# Patient Record
Sex: MALE | Race: WHITE | NOT HISPANIC OR LATINO | Employment: FULL TIME | URBAN - METROPOLITAN AREA
[De-identification: names, ages, dates, MRNs, and addresses within clinical notes are randomized per-mention and may not be internally consistent; named-entity substitution may affect disease eponyms.]

---

## 2017-01-23 ENCOUNTER — HOSPITAL ENCOUNTER (OUTPATIENT)
Dept: RADIOLOGY | Facility: HOSPITAL | Age: 57
Discharge: HOME | End: 2017-01-23
Attending: ORTHOPAEDIC SURGERY
Payer: COMMERCIAL

## 2017-01-23 ENCOUNTER — OFFICE VISIT (OUTPATIENT)
Dept: SPORTS MEDICINE | Facility: CLINIC | Age: 57
End: 2017-01-23
Payer: COMMERCIAL

## 2017-01-23 VITALS
DIASTOLIC BLOOD PRESSURE: 87 MMHG | WEIGHT: 169 LBS | HEIGHT: 67 IN | SYSTOLIC BLOOD PRESSURE: 133 MMHG | BODY MASS INDEX: 26.53 KG/M2 | HEART RATE: 69 BPM

## 2017-01-23 DIAGNOSIS — M25.552 LEFT HIP PAIN: Primary | ICD-10-CM

## 2017-01-23 DIAGNOSIS — M25.552 LEFT HIP PAIN: ICD-10-CM

## 2017-01-23 PROCEDURE — 1159F MED LIST DOCD IN RCRD: CPT | Mod: S$GLB,,, | Performed by: ORTHOPAEDIC SURGERY

## 2017-01-23 PROCEDURE — 99212 OFFICE O/P EST SF 10 MIN: CPT | Mod: S$GLB,,, | Performed by: ORTHOPAEDIC SURGERY

## 2017-01-23 PROCEDURE — 97110 THERAPEUTIC EXERCISES: CPT | Mod: S$GLB,,, | Performed by: ORTHOPAEDIC SURGERY

## 2017-01-23 PROCEDURE — 73502 X-RAY EXAM HIP UNI 2-3 VIEWS: CPT | Mod: 26,,, | Performed by: RADIOLOGY

## 2017-01-23 PROCEDURE — 99999 PR PBB SHADOW E&M-EST. PATIENT-LVL III: CPT | Mod: PBBFAC,,, | Performed by: ORTHOPAEDIC SURGERY

## 2017-01-23 PROCEDURE — 73502 X-RAY EXAM HIP UNI 2-3 VIEWS: CPT | Mod: TC,PO

## 2017-01-23 NOTE — MR AVS SNAPSHOT
[facility]  [address]  [facility] [address]  Phone: [phone]                  Kar Lee   2017 7:30 AM   Appointment    Description:  Male : 1960   Provider:  Kristin Huston MD   Department:  [facility]                To Do List           Future Appointments        Provider Department Dept Phone    2017 7:30 AM Kristin Huston MD [facility] [phone]      Goals (5 Years of Data)     None      [facility]     [facility] Nurse Care Line -  Assistance  Registered nurses in the [facility] Call Center provide clinical advisement, health education, appointment booking, and other advisory services.  Call for this free service at [phone].             Medications           Message regarding Medications     Verify the changes and/or additions to your medication regime listed below are the same as discussed with your clinician today.  If any of these changes or additions are incorrect, please notify your healthcare provider.             Verify that the below list of medications is an accurate representation of the medications you are currently taking.  If none reported, the list may be blank. If incorrect, please contact your healthcare provider. Carry this list with you in case of emergency.           Current Medications     glucosamine-chondroitin 500-400 mg tablet Take 1 tablet by mouth 3 (three) times daily.    lisinopril (PRINIVIL,ZESTRIL) 20 MG tablet Take 20 mg by mouth once daily.    multivitamin (THERAGRAN) per tablet Take 1 tablet by mouth once daily.           Clinical Reference Information           Allergies as of 2017     No Known Allergies      Immunizations Administered on Date of Encounter - 2017     None

## 2017-01-23 NOTE — PROGRESS NOTES
CC: S/p L hip scope 1/19/16    HISTORY OF PRESENT ILLNESS:   Pt is here today for post-operative followup of his hip arthroscopy.  he is doing well.  We have reviewed his findings and discussed plan of care and future treatment options. He runs 10-12 miles per week.    Feels approx 85% better now than prior to surgery.    Left IT band tight       Left:  1/19/16  1. Hip arthroscopic labral debridement  2. Hip arthroscopic femoral neck osteoplasty  3. Hip arthroscopic partial synovectomy/debridement  4. Hip arthroscopic chondroplasty  5. 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 Negative for chills, fever and night sweats.   HENT: Negative for congestion and headaches.    Eyes: Negative for blurred vision, left vision loss and right vision loss.   Cardiovascular: Negative for chest pain and syncope.   Respiratory: Negative for cough and shortness of breath.    Endocrine: Negative for polydipsia, polyphagia and polyuria.   Hematologic/Lymphatic: Negative for bleeding problem. Does not bruise/bleed easily.   Skin: Negative for dry skin, itching and rash.   Musculoskeletal: Negative for falls and muscle weakness.   Gastrointestinal: Negative for abdominal pain and bowel incontinence.   Genitourinary: Negative for bladder incontinence and nocturia.   Neurological: Negative for disturbances in coordination, loss of balance and seizures.   Psychiatric/Behavioral: Negative for depression. The patient does not have insomnia.    Allergic/Immunologic: Negative for hives and persistent infections.     PAST MEDICAL HISTORY:   Past Medical History   Diagnosis Date    Hypertension      PAST  "SURGICAL HISTORY:   Past Surgical History   Procedure Laterality Date    Tonsillectomy      Hip surgery Left 1/19/2016     Dr Huston      FAMILY HISTORY: No family history on file.  SOCIAL HISTORY:   Social History     Social History    Marital status:      Spouse name: N/A    Number of children: N/A    Years of education: N/A     Occupational History    Not on file.     Social History Main Topics    Smoking status: Never Smoker    Smokeless tobacco: Not on file    Alcohol use 1.2 oz/week     1 Cans of beer, 1 Shots of liquor, 0 Standard drinks or equivalent per week      Comment: occasional    Drug use: No    Sexual activity: Not on file     Other Topics Concern    Not on file     Social History Narrative       MEDICATIONS:   Current Outpatient Prescriptions:     glucosamine-chondroitin 500-400 mg tablet, Take 1 tablet by mouth 3 (three) times daily., Disp: , Rfl:     lisinopril (PRINIVIL,ZESTRIL) 20 MG tablet, Take 20 mg by mouth once daily., Disp: , Rfl:     multivitamin (THERAGRAN) per tablet, Take 1 tablet by mouth once daily., Disp: , Rfl:   ALLERGIES: Review of patient's allergies indicates:  No Known Allergies    VITAL SIGNS:   Visit Vitals    /87    Pulse 69    Ht 5' 7" (1.702 m)    Wt 76.7 kg (169 lb)    BMI 26.47 kg/m2                 PHYSICAL EXAMINATION:     Flexion ROM 0-120 degrees   IR with load 15  IR without load 20  ER 35  abd 60  Add 25  - Fadir  5/5 strength  No effusion  2+ DP pulse  No swelling, no calf tenderness  + IT band tightness Left >Right                                                                               ASSESSMENT:                                                                                                                                               1. Status post above, doing well.                                                                                                                               PLAN:                            "                                                                                                                          1. PT IT band  2. Emphasized core function.  3. Ok to progress in sports as tolerated  4. F/U in 6 months    HEP 11267 - I, instructed and demonstrated a IT band HEP. The patient then demonstrated understanding of exercises and proper technique. This program was performed for 16 minutes.

## 2017-02-20 ENCOUNTER — CLINICAL SUPPORT (OUTPATIENT)
Dept: REHABILITATION | Facility: HOSPITAL | Age: 57
End: 2017-02-20
Attending: ORTHOPAEDIC SURGERY
Payer: COMMERCIAL

## 2017-02-20 DIAGNOSIS — M25.552 LEFT HIP PAIN: Primary | ICD-10-CM

## 2017-02-20 PROCEDURE — 97110 THERAPEUTIC EXERCISES: CPT | Performed by: PHYSICAL THERAPIST

## 2017-02-20 PROCEDURE — 97161 PT EVAL LOW COMPLEX 20 MIN: CPT | Performed by: PHYSICAL THERAPIST

## 2017-02-20 NOTE — PROGRESS NOTES
[facility]   PATIENT EVALUATION    Date: 02/20/2017  Start Time: 5:30  Stop Time: 6:30    Patient Name: Kar Lee  Clinic Number: [id number]  Age: 56 y.o.  Gender: male    Diagnosis: Left hip IT band tension  Encounter Diagnosis   Name Primary?    Left hip pain Yes   Left: 1/19/16  1. Hip arthroscopic labral debridement  2. Hip arthroscopic femoral neck osteoplasty  3. Hip arthroscopic partial synovectomy/debridement  4. Hip arthroscopic chondroplasty  5. Hip arthroscopic capsular closure       Referring Physician: Kristin Huston MD  Treatment Orders: PT Eval and Treat      History     Past Medical History   Diagnosis Date    Hypertension        Current Outpatient Prescriptions   Medication Sig    glucosamine-chondroitin 500-400 mg tablet Take 1 tablet by mouth 3 (three) times daily.    lisinopril (PRINIVIL,ZESTRIL) 20 MG tablet Take 20 mg by mouth once daily.    multivitamin (THERAGRAN) per tablet Take 1 tablet by mouth once daily.     No current facility-administered medications for this visit.        Review of patient's allergies indicates:  No Known Allergies      Subjective     History of Present Condition: Pt reports having left hip tension that has been going on for the past 6 months. It has been waking me up off and on. I havent been running as much because of my work schedule. The hip was much better after PT after the hip surgery.  States I would like to get back to running without pain.    Onset Date: 6 mo ago  Date of Surgery: 1/19/16  Precautions: none    Mechanism of Injury: gradual    Pain Location: left hip pain   Pain Description: Aching, Dull and Burning  Current Pain: 4/10  Least Pain: 1/10  Worst Pain: 8/10  Aggravating Factors: running, sleeping on left side, quick movements  Relieving Factors: rest, ice      Prior Therapy: with success after left hip scope labral repair    Occupation: accounting    Sports/Recreational Activities: running  Extremity  "Dominance: R    Prior Level of Function: Independent  Functional Deficits Leading to Referral/Nature of Injury: none  Patient Therapy Goals: "To get back to running and sleeping without pain"  Cultural/Environmental/Spiritual Barriers to Treatment or Learning: none      Objective       Posture: upper trunk kyphosis  Gait: increased left hip stance phase with decreased knee and hip flexion, with slight left toe out    Palpation: TTP along left hip flexor, left IT band, andd left vastus lateralis     Range of Motion: Grossly WFL as compared bilaterally    Joint Mobility: Grossly WFL  Flexibility: increased tension along left hip flexor and quads    Strength:   Left hip abd: 3/5  Right hip abd: 4/5    diminshed core stability    Special Tests: Left: + Clarence,s  + Noble's, - FADIR, - Scour    Other:   SLS with increased forward trunk collapse and LLE valgus    Treatment:   - clamshells 3x10  - single leg bridges 3x10  - side steps x 2 laps        Functional Limitations Reports - G Codes  Category: Mobility  Tool: FOTO  Score: 65      History  Co-morbidities and personal factors that may impact the plan of care Low    Stable Clinical Presentation   Co-morbidities:       Personal Factors:     Body regions    Body systems    Activity Limitations    Participation Restrictons   No personal factors and/ or  comorbidites          Address 1-2 elements:           Assessment     This is a 56 y.o. male referred to outpatient physical therapy and presents with a medical diagnosis of left hip pain/IT band issue and demonstrates limitations as described in the problem list. Pt demonstrates good rehab potential. Pt will benefit from physcial therapy services in order to maximize pain free and/or functional use of left hip. The following goals were discussed with the patient and patient is in agreement with them as to be addressed in the treatment plan. Pt was given a HEP consisting of functional hip PREs. Pt verbally understood the " instructions as they were given and demonstrated proper form and technique during therapy. Pt was advised to perform these exercises free of pain, and to stop performing them if pain occurs.     Medical necessity is demonstrated by the following problem list:   - Pain limits function of effected part for all activities  - Unable to participate in daily activities   - Requires skilled supervision to complete and progress HEP  - Fall risk - impaired balance   - Continued inability to participate in vocational pursuits    Short Term Goals (6 Weeks):  - Pt will increase ROM to WFL and painfree.   - Pt will increase strength of bilateral hip abd = 4+/5  - Decrease Pain to 0/10 with therex  - Pt to self correct posture independently.  - Pt independent with HEP with progressions.     Long Term Goals (12 Weeks):  - Pt able to SLS x 2 min with normal mechanics and without knee valgus.  - Pt will increase strength of bilateral hip abd/core = 5/5  - Decrease Pain to 0/10 with sleeping.  - Pt to return to running with normal mechanics without pain.      Plan     Pt will be treated by physical therapy 1-2 times a week for 10-12 weeks for manual therapy, therapeutic exercise, home exercise program, patient education, and modalities PRN to achieve established goals. Kar may at times be seen by a PTA as part of the Rehab Team.     Therapist: KAYLA BRIDGES, PT    I CERTIFY THE NEED FOR THESE SERVICES FURNISHED UNDER THIS PLAN OF TREATMENT AND WHILE UNDER MY CARE    Physician's comments: ________________________________________________________________________________________________________________________________________________    Physician's Name: ___________________________________

## 2017-03-01 ENCOUNTER — CLINICAL SUPPORT (OUTPATIENT)
Dept: REHABILITATION | Facility: HOSPITAL | Age: 57
End: 2017-03-01
Attending: ORTHOPAEDIC SURGERY
Payer: COMMERCIAL

## 2017-03-01 DIAGNOSIS — M25.552 LEFT HIP PAIN: Primary | ICD-10-CM

## 2017-03-01 PROCEDURE — 97110 THERAPEUTIC EXERCISES: CPT | Performed by: PHYSICAL THERAPIST

## 2017-03-01 NOTE — PROGRESS NOTES
Physical Therapy Daily Note     Date: 03/01/2017  Name: Kar Lee  Clinic Number: [id number]  Diagnosis:   Encounter Diagnosis   Name Primary?    Left hip pain Yes     Physician: Kristin Huston MD    Evaluation Date: 2/20/17  Date of Surgery: 2/20/17  Visit #: 2   Start Time:  8:30  Stop Time:  9:30  Total Treatment Time: 60    Precautions: none    Subjective     Pt reports the left hip has been feeling much better since doing the HEP.     Pain: 1/10    Objective     Measurements taken: none    Patient received individual therapy to increase strength, endurance and ROM with activities as follows:     Kar received therapeutic exercises to develop strength, endurance and ROM for 40 minutes including:   Bike x 5 min  Clamshells 3x10  Standing hip abd 3x10  SL bridges 3x10  Side steps x 2 laps  6 in step downs 3x1x0  Lunge walks x 2 laps  SLS x10          Kar received the following manual therapy techniques: Joint mobilizations and Soft tissue Mobilization were applied to the: left hip for 5 minutes.   SL quad/hip flexor stretch    Written Home Exercises Provided: updated as per therex list  Pt demo good understanding of the education provided. Kar demonstrated good return demonstration of activities.     Education provided:  Kar verbalized good understanding of education provided.   No spiritual or educational barriers to learning identified.    Assessment     Improved functional hip and core activation. Appropriate loading mechanics and stability with single leg therex.    This is a 56 y.o. male referred to outpatient physical therapy and presents with a medical diagnosis of left hip pain and demonstrates limitations as described in the problem list Pt prognosis is Good. Pt will continue to benefit from skilled outpatient physical therapy to address the deficits listed below in the problem list, provide pt/family education and to maximize pt's level of independence  in the home and community environment.    Will the patient continue to benefit from skilled PT intervention? yes        Medical necessity is demonstrated by:   - Pain limits function of effected part for all activities  - Unable to participate in daily activities   - Requires skilled supervision to complete and progress HEP  - Fall risk - impaired balance   - Continued inability to participate in vocational pursuits    Short Term Goals (6 Weeks):  - Pt will increase ROM to WFL and painfree.   - Pt will increase strength of bilateral hip abd = 4+/5  - Decrease Pain to 0/10 with therex  - Pt to self correct posture independently.  - Pt independent with HEP with progressions.      Long Term Goals (12 Weeks):  - Pt able to SLS x 2 min with normal mechanics and without knee valgus.  - Pt will increase strength of bilateral hip abd/core = 5/5  - Decrease Pain to 0/10 with sleeping.  - Pt to return to running with normal mechanics without pain     Plan   Continue with established Plan of Care towards PT goals.      Therapist: KAYLA BRIDGES, PT

## 2017-03-08 ENCOUNTER — CLINICAL SUPPORT (OUTPATIENT)
Dept: REHABILITATION | Facility: HOSPITAL | Age: 57
End: 2017-03-08
Attending: ORTHOPAEDIC SURGERY
Payer: COMMERCIAL

## 2017-03-08 DIAGNOSIS — M25.552 LEFT HIP PAIN: Primary | ICD-10-CM

## 2017-03-08 PROCEDURE — 97110 THERAPEUTIC EXERCISES: CPT | Performed by: PHYSICAL THERAPIST

## 2017-03-15 ENCOUNTER — CLINICAL SUPPORT (OUTPATIENT)
Dept: REHABILITATION | Facility: HOSPITAL | Age: 57
End: 2017-03-15
Attending: ORTHOPAEDIC SURGERY
Payer: COMMERCIAL

## 2017-03-15 DIAGNOSIS — M25.552 LEFT HIP PAIN: Primary | ICD-10-CM

## 2017-03-15 PROCEDURE — 97110 THERAPEUTIC EXERCISES: CPT | Performed by: PHYSICAL THERAPIST

## 2017-03-15 NOTE — PROGRESS NOTES
Physical Therapy Daily Note     Date: 03/15/2017  Name: Kar Lee  Clinic Number: [id number]  Diagnosis:   Encounter Diagnosis   Name Primary?    Left hip pain Yes     Physician: Kristin Huston MD    Evaluation Date: 2/20/17  Date of Surgery: 2/20/17  Visit #: 4  Start Time:  5:30  Stop Time:  6:05  Total Treatment Time: 35    Precautions: none    Subjective     Pt reports the left hip is doing well. I can keep up with the HEP     Pain: 0/10    Objective     Measurements taken: none    Patient received individual therapy to increase strength, endurance and ROM with activities as follows:     Kar received therapeutic exercises to develop strength, endurance and ROM for 35 minutes including:   Bike x 5 min  Clamshells 3x10  Standing hip abd 3x10  SL bridges 3x10  Side steps x 2 laps  6 in step downs 3x1x0  Lunge walks x 2 laps  SLS x10  Light jogging x 2 laps           Kar received the following manual therapy techniques: Joint mobilizations and Soft tissue Mobilization were applied to the: left hip for 5 minutes.   SL quad/hip flexor stretch    Written Home Exercises Provided: updated as per therex list  Pt demo good understanding of the education provided. Kar demonstrated good return demonstration of activities.     Education provided:  Kar verbalized good understanding of education provided.   No spiritual or educational barriers to learning identified.    Assessment     Excellent single leg loading mechanics without symptoms. Pt with good running mechanics as well. Prepare for DC with running as pt is asymptomatic and independent with HEP.    This is a 56 y.o. male referred to outpatient physical therapy and presents with a medical diagnosis of left hip pain and demonstrates limitations as described in the problem list Pt prognosis is Good. Pt will continue to benefit from skilled outpatient physical therapy to address the deficits listed below in the problem  list, provide pt/family education and to maximize pt's level of independence in the home and community environment.    Will the patient continue to benefit from skilled PT intervention? yes        Medical necessity is demonstrated by:   - Pain limits function of effected part for all activities  - Unable to participate in daily activities   - Requires skilled supervision to complete and progress HEP  - Fall risk - impaired balance   - Continued inability to participate in vocational pursuits    Short Term Goals (6 Weeks):  - Pt will increase ROM to WFL and painfree.   - Pt will increase strength of bilateral hip abd = 4+/5  - Decrease Pain to 0/10 with therex  - Pt to self correct posture independently.  - Pt independent with HEP with progressions.      Long Term Goals (12 Weeks):  - Pt able to SLS x 2 min with normal mechanics and without knee valgus.  - Pt will increase strength of bilateral hip abd/core = 5/5  - Decrease Pain to 0/10 with sleeping.  - Pt to return to running with normal mechanics without pain     Plan   Continue with established Plan of Care towards PT goals.      Therapist: KAYLA BRIDGES, PT

## 2017-05-03 ENCOUNTER — OFFICE VISIT (OUTPATIENT)
Dept: OPTOMETRY | Facility: CLINIC | Age: 57
End: 2017-05-03
Payer: COMMERCIAL

## 2017-05-03 DIAGNOSIS — H18.821 CONTACT LENS OVERWEAR OF RIGHT EYE: ICD-10-CM

## 2017-05-03 DIAGNOSIS — H00.015 HORDEOLUM EXTERNUM LEFT LOWER EYELID: Primary | ICD-10-CM

## 2017-05-03 PROCEDURE — 99999 PR PBB SHADOW E&M-EST. PATIENT-LVL II: CPT | Mod: PBBFAC,,, | Performed by: OPTOMETRIST

## 2017-05-03 PROCEDURE — 92004 COMPRE OPH EXAM NEW PT 1/>: CPT | Mod: S$GLB,,, | Performed by: OPTOMETRIST

## 2017-05-03 RX ORDER — TOBRAMYCIN 3 MG/ML
1 SOLUTION/ DROPS OPHTHALMIC 4 TIMES DAILY
Qty: 5 ML | Refills: 1 | Status: SHIPPED | OUTPATIENT
Start: 2017-05-03 | End: 2017-05-13

## 2017-05-03 NOTE — MR AVS SNAPSHOT
[facility]   [facility]  [place] [address]  Phone: [phone]  Fax: [phone]                  Kar Lee   5/3/2017 1:15 PM   Office Visit    Description:  Male : 1960   Provider:  Yasir Fowler OD   Department:  [facility]           Reason for Visit     Concerns About Ocular Health           Diagnoses this Visit        Comments    Hordeolum externum left lower eyelid    -  Primary     Contact lens overwear of right eye                To Do List           Goals (5 Years of Data)     None       These Medications        Disp Refills Start End    tobramycin sulfate 0.3% (TOBREX) 0.3 % ophthalmic solution 5 mL 1 5/3/2017 2017    Place 1 drop into both eyes 4 (four) times daily. - Both Eyes    Pharmacy: [id number] [address]  [address] AT [facility] [place] [address] [place] Ph #: [phone]         [facility]     [facility] On Call Nurse Care Line -  Assistance  Unless otherwise directed by your provider, please contact [facility], our nurse care line that is available for  assistance.     Registered nurses in the [facility] Center provide: appointment scheduling, clinical advisement, health education, and other advisory services.  Call: [phone] (toll free)               Medications           Message regarding Medications     Verify the changes and/or additions to your medication regime listed below are the same as discussed with your clinician today.  If any of these changes or additions are incorrect, please notify your healthcare provider.        START taking these NEW medications        Refills    tobramycin sulfate 0.3% (TOBREX) 0.3 % ophthalmic solution 1    Sig: Place 1 drop into both eyes 4 (four) times daily.    Class: Normal    Route: Both Eyes           Verify that the below list of medications is an accurate representation of the medications you are currently taking.  If none reported,  the list may be blank. If incorrect, please contact your healthcare provider. Carry this list with you in case of emergency.           Current Medications     glucosamine-chondroitin 500-400 mg tablet Take 1 tablet by mouth 3 (three) times daily.    lisinopril (PRINIVIL,ZESTRIL) 20 MG tablet Take 20 mg by mouth once daily.    multivitamin (THERAGRAN) per tablet Take 1 tablet by mouth once daily.    tobramycin sulfate 0.3% (TOBREX) 0.3 % ophthalmic solution Place 1 drop into both eyes 4 (four) times daily.           Clinical Reference Information           Allergies as of 5/3/2017     No Known Allergies      Immunizations Administered on Date of Encounter - 5/3/2017     None      Language Assistance Services     ATTENTION: Language assistance services are available, free of charge. Please call [phone].      ATENCIÓN: Si lynette dowlingdaksha, tiene a aguilar disposición servicios gratuitos de asistencia lingüística. Llame al [phone].     CHÚ Ý: N?u b?n nói Ti?ng Vi?t, có các d?ch v? h? tr? ngôn ng? mi?n phí dành cho b?n. G?i s? [phone].         [facility] complies with applicable Federal civil rights laws and does not discriminate on the basis of race, color, national origin, age, disability, or sex.

## 2017-05-05 ENCOUNTER — TELEPHONE (OUTPATIENT)
Dept: OPTOMETRY | Facility: CLINIC | Age: 57
End: 2017-05-05

## 2017-05-05 NOTE — TELEPHONE ENCOUNTER
Called pt 3:45.  Doing better on meds.  Cont TOBRAMYCIN QID OU, and if not resolved wed pt will call back (may need chal removal OS)

## 2017-09-20 ENCOUNTER — PATIENT MESSAGE (OUTPATIENT)
Dept: INTERNAL MEDICINE | Facility: CLINIC | Age: 57
End: 2017-09-20

## 2017-09-20 ENCOUNTER — LAB VISIT (OUTPATIENT)
Dept: LAB | Facility: HOSPITAL | Age: 57
End: 2017-09-20
Attending: INTERNAL MEDICINE
Payer: COMMERCIAL

## 2017-09-20 ENCOUNTER — OFFICE VISIT (OUTPATIENT)
Dept: INTERNAL MEDICINE | Facility: CLINIC | Age: 57
End: 2017-09-20
Payer: COMMERCIAL

## 2017-09-20 VITALS
BODY MASS INDEX: 24.85 KG/M2 | WEIGHT: 158.31 LBS | HEART RATE: 58 BPM | TEMPERATURE: 98 F | RESPIRATION RATE: 12 BRPM | SYSTOLIC BLOOD PRESSURE: 124 MMHG | HEIGHT: 67 IN | DIASTOLIC BLOOD PRESSURE: 82 MMHG

## 2017-09-20 DIAGNOSIS — D64.9 NORMOCYTIC ANEMIA: ICD-10-CM

## 2017-09-20 DIAGNOSIS — Z12.11 SCREEN FOR COLON CANCER: ICD-10-CM

## 2017-09-20 DIAGNOSIS — Z80.42 FAMILY HISTORY OF PROSTATE CANCER: ICD-10-CM

## 2017-09-20 DIAGNOSIS — Z00.00 ANNUAL PHYSICAL EXAM: ICD-10-CM

## 2017-09-20 DIAGNOSIS — Z11.59 NEED FOR HEPATITIS C SCREENING TEST: ICD-10-CM

## 2017-09-20 DIAGNOSIS — Z00.00 ANNUAL PHYSICAL EXAM: Primary | ICD-10-CM

## 2017-09-20 LAB
ALBUMIN SERPL BCP-MCNC: 4.2 G/DL
ALP SERPL-CCNC: 56 U/L
ALT SERPL W/O P-5'-P-CCNC: 16 U/L
ANION GAP SERPL CALC-SCNC: 10 MMOL/L
AST SERPL-CCNC: 18 U/L
BASOPHILS # BLD AUTO: 0.02 K/UL
BASOPHILS NFR BLD: 0.5 %
BILIRUB SERPL-MCNC: 1.2 MG/DL
BUN SERPL-MCNC: 20 MG/DL
CALCIUM SERPL-MCNC: 9.4 MG/DL
CHLORIDE SERPL-SCNC: 106 MMOL/L
CHOLEST SERPL-MCNC: 182 MG/DL
CHOLEST/HDLC SERPL: 4 {RATIO}
CO2 SERPL-SCNC: 24 MMOL/L
COMPLEXED PSA SERPL-MCNC: 1.4 NG/ML
CREAT SERPL-MCNC: 1 MG/DL
DIFFERENTIAL METHOD: ABNORMAL
EOSINOPHIL # BLD AUTO: 0.1 K/UL
EOSINOPHIL NFR BLD: 2.5 %
ERYTHROCYTE [DISTWIDTH] IN BLOOD BY AUTOMATED COUNT: 13.5 %
EST. GFR  (AFRICAN AMERICAN): >60 ML/MIN/1.73 M^2
EST. GFR  (NON AFRICAN AMERICAN): >60 ML/MIN/1.73 M^2
ESTIMATED AVG GLUCOSE: 105 MG/DL
FERRITIN SERPL-MCNC: 81 NG/ML
GLUCOSE SERPL-MCNC: 91 MG/DL
HBA1C MFR BLD HPLC: 5.3 %
HCT VFR BLD AUTO: 37.2 %
HCV AB SERPL QL IA: NEGATIVE
HDLC SERPL-MCNC: 45 MG/DL
HDLC SERPL: 24.7 %
HGB BLD-MCNC: 12.8 G/DL
HIV 1+2 AB+HIV1 P24 AG SERPL QL IA: NEGATIVE
IRON SERPL-MCNC: 163 UG/DL
LDLC SERPL CALC-MCNC: 122.4 MG/DL
LYMPHOCYTES # BLD AUTO: 1.3 K/UL
LYMPHOCYTES NFR BLD: 33.4 %
MCH RBC QN AUTO: 31.1 PG
MCHC RBC AUTO-ENTMCNC: 34.4 G/DL
MCV RBC AUTO: 91 FL
MONOCYTES # BLD AUTO: 0.3 K/UL
MONOCYTES NFR BLD: 6.5 %
NEUTROPHILS # BLD AUTO: 2.3 K/UL
NEUTROPHILS NFR BLD: 57.1 %
NONHDLC SERPL-MCNC: 137 MG/DL
PLATELET # BLD AUTO: 196 K/UL
PMV BLD AUTO: 11.1 FL
POTASSIUM SERPL-SCNC: 4.5 MMOL/L
PROT SERPL-MCNC: 7.2 G/DL
RBC # BLD AUTO: 4.11 M/UL
SATURATED IRON: 36 %
SODIUM SERPL-SCNC: 140 MMOL/L
TOTAL IRON BINDING CAPACITY: 451 UG/DL
TRANSFERRIN SERPL-MCNC: 305 MG/DL
TRIGL SERPL-MCNC: 73 MG/DL
TSH SERPL DL<=0.005 MIU/L-ACNC: 1.46 UIU/ML
WBC # BLD AUTO: 4.01 K/UL

## 2017-09-20 PROCEDURE — 36415 COLL VENOUS BLD VENIPUNCTURE: CPT | Mod: PO

## 2017-09-20 PROCEDURE — 86703 HIV-1/HIV-2 1 RESULT ANTBDY: CPT

## 2017-09-20 PROCEDURE — 99396 PREV VISIT EST AGE 40-64: CPT | Mod: S$GLB,,, | Performed by: INTERNAL MEDICINE

## 2017-09-20 PROCEDURE — 84153 ASSAY OF PSA TOTAL: CPT

## 2017-09-20 PROCEDURE — 80061 LIPID PANEL: CPT

## 2017-09-20 PROCEDURE — 85025 COMPLETE CBC W/AUTO DIFF WBC: CPT

## 2017-09-20 PROCEDURE — 83540 ASSAY OF IRON: CPT

## 2017-09-20 PROCEDURE — 82728 ASSAY OF FERRITIN: CPT

## 2017-09-20 PROCEDURE — 84443 ASSAY THYROID STIM HORMONE: CPT

## 2017-09-20 PROCEDURE — 86803 HEPATITIS C AB TEST: CPT

## 2017-09-20 PROCEDURE — 80053 COMPREHEN METABOLIC PANEL: CPT

## 2017-09-20 PROCEDURE — 83036 HEMOGLOBIN GLYCOSYLATED A1C: CPT

## 2017-09-20 PROCEDURE — 99999 PR PBB SHADOW E&M-EST. PATIENT-LVL III: CPT | Mod: PBBFAC,,, | Performed by: INTERNAL MEDICINE

## 2017-09-20 NOTE — TELEPHONE ENCOUNTER
Results released to patient portal. Significant for anemia, encouraged screening colonoscopy (already ordered).
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
## 2017-09-29 ENCOUNTER — TELEPHONE (OUTPATIENT)
Dept: INTERNAL MEDICINE | Facility: CLINIC | Age: 57
End: 2017-09-29

## 2017-09-29 NOTE — TELEPHONE ENCOUNTER
Patient had some questions concerning the test results on anemia. Patient wanted to know if something that he can do to increase numbers concerning his iron levels. Patient stated that you can send a my chart messages with recommendations. thanks

## 2017-09-29 NOTE — TELEPHONE ENCOUNTER
----- Message from Tarsha Rapp sent at 9/29/2017  9:36 AM CDT -----  Contact: Self. Call him at [phone]  Patient would like to get test results.  Name of test (lab, mammo, etc.):  Labs & Urine  Date of test:  9/20  Ordering provider: Dr. Esposito  Where was the test performed:  [facility]  Comments:  Saw results but has questons.

## 2017-10-04 ENCOUNTER — TELEPHONE (OUTPATIENT)
Dept: INTERNAL MEDICINE | Facility: CLINIC | Age: 57
End: 2017-10-04

## 2017-10-04 NOTE — TELEPHONE ENCOUNTER
Informed the patient that the colonscopy department will contact to schedule an appointment, I also gave the patient the number to colon and rectal to schedule appointment.

## 2017-10-04 NOTE — TELEPHONE ENCOUNTER
----- Message from Sarah Parekh sent at 10/4/2017  2:36 PM CDT -----  Contact: [phone]  patient  Cynthia, Please call pt. He said that when he had his physical 2 weeks ago  told him that he would be called because she wanted him to have some tests done/ a colonoscopy and he isn't sure what else. I don't see any orders for this. Please check notes and call pt.

## 2017-10-11 DIAGNOSIS — Z12.11 SPECIAL SCREENING FOR MALIGNANT NEOPLASMS, COLON: Primary | ICD-10-CM

## 2017-10-11 RX ORDER — POLYETHYLENE GLYCOL 3350, SODIUM SULFATE ANHYDROUS, SODIUM BICARBONATE, SODIUM CHLORIDE, POTASSIUM CHLORIDE 236; 22.74; 6.74; 5.86; 2.97 G/4L; G/4L; G/4L; G/4L; G/4L
4 POWDER, FOR SOLUTION ORAL ONCE
Qty: 4000 ML | Refills: 0 | Status: SHIPPED | OUTPATIENT
Start: 2017-10-11 | End: 2017-10-11

## 2017-11-20 ENCOUNTER — ANESTHESIA (OUTPATIENT)
Dept: ENDOSCOPY | Facility: HOSPITAL | Age: 57
End: 2017-11-20
Payer: COMMERCIAL

## 2017-11-20 ENCOUNTER — HOSPITAL ENCOUNTER (OUTPATIENT)
Facility: HOSPITAL | Age: 57
Discharge: HOME | End: 2017-11-20
Attending: COLON & RECTAL SURGERY | Admitting: COLON & RECTAL SURGERY
Payer: COMMERCIAL

## 2017-11-20 ENCOUNTER — ANESTHESIA EVENT (OUTPATIENT)
Dept: ENDOSCOPY | Facility: HOSPITAL | Age: 57
End: 2017-11-20
Payer: COMMERCIAL

## 2017-11-20 ENCOUNTER — SURGERY (OUTPATIENT)
Age: 57
End: 2017-11-20

## 2017-11-20 VITALS
OXYGEN SATURATION: 100 % | HEART RATE: 55 BPM | HEIGHT: 67 IN | SYSTOLIC BLOOD PRESSURE: 112 MMHG | RESPIRATION RATE: 18 BRPM | WEIGHT: 152 LBS | DIASTOLIC BLOOD PRESSURE: 76 MMHG | BODY MASS INDEX: 23.86 KG/M2 | TEMPERATURE: 98 F

## 2017-11-20 DIAGNOSIS — Z12.11 SPECIAL SCREENING FOR MALIGNANT NEOPLASMS, COLON: ICD-10-CM

## 2017-11-20 PROCEDURE — G0121 COLON CA SCRN NOT HI RSK IND: HCPCS | Mod: ,,, | Performed by: COLON & RECTAL SURGERY

## 2017-11-20 PROCEDURE — D9220A PRA ANESTHESIA: Mod: 33,CRNA,, | Performed by: NURSE ANESTHETIST, CERTIFIED REGISTERED

## 2017-11-20 PROCEDURE — D9220A PRA ANESTHESIA: Mod: 33,ANES,, | Performed by: ANESTHESIOLOGY

## 2017-11-20 PROCEDURE — S5010 5% DEXTROSE AND 0.45% SALINE: HCPCS | Performed by: COLON & RECTAL SURGERY

## 2017-11-20 PROCEDURE — 37000008 HC ANESTHESIA 1ST 15 MINUTES: Performed by: COLON & RECTAL SURGERY

## 2017-11-20 PROCEDURE — 25000003 PHARM REV CODE 250: Performed by: COLON & RECTAL SURGERY

## 2017-11-20 PROCEDURE — 63600175 PHARM REV CODE 636 W HCPCS: Performed by: NURSE ANESTHETIST, CERTIFIED REGISTERED

## 2017-11-20 PROCEDURE — G0121 COLON CA SCRN NOT HI RSK IND: HCPCS | Performed by: COLON & RECTAL SURGERY

## 2017-11-20 PROCEDURE — 37000009 HC ANESTHESIA EA ADD 15 MINS: Performed by: COLON & RECTAL SURGERY

## 2017-11-20 RX ORDER — PROPOFOL 10 MG/ML
VIAL (ML) INTRAVENOUS
Status: DISCONTINUED | OUTPATIENT
Start: 2017-11-20 | End: 2017-11-20

## 2017-11-20 RX ORDER — DEXTROSE MONOHYDRATE AND SODIUM CHLORIDE 5; .45 G/100ML; G/100ML
INJECTION, SOLUTION INTRAVENOUS CONTINUOUS
Status: DISCONTINUED | OUTPATIENT
Start: 2017-11-20 | End: 2017-11-20 | Stop reason: HOSPADM

## 2017-11-20 RX ORDER — LIDOCAINE HCL/PF 100 MG/5ML
SYRINGE (ML) INTRAVENOUS
Status: DISCONTINUED | OUTPATIENT
Start: 2017-11-20 | End: 2017-11-20

## 2017-11-20 RX ADMIN — PROPOFOL 40 MG: 10 INJECTION, EMULSION INTRAVENOUS at 10:11

## 2017-11-20 RX ADMIN — PROPOFOL 30 MG: 10 INJECTION, EMULSION INTRAVENOUS at 11:11

## 2017-11-20 RX ADMIN — DEXTROSE MONOHYDRATE AND SODIUM CHLORIDE: 5; .45 INJECTION, SOLUTION INTRAVENOUS at 10:11

## 2017-11-20 RX ADMIN — PROPOFOL 50 MG: 10 INJECTION, EMULSION INTRAVENOUS at 11:11

## 2017-11-20 RX ADMIN — PROPOFOL 50 MG: 10 INJECTION, EMULSION INTRAVENOUS at 10:11

## 2017-11-20 RX ADMIN — LIDOCAINE HYDROCHLORIDE 100 MG: 20 INJECTION, SOLUTION INTRAVENOUS at 10:11

## 2017-11-20 RX ADMIN — PROPOFOL 60 MG: 10 INJECTION, EMULSION INTRAVENOUS at 10:11

## 2017-11-20 RX ADMIN — DEXTROSE AND SODIUM CHLORIDE: 5; .45 INJECTION, SOLUTION INTRAVENOUS at 10:11

## 2017-11-20 NOTE — DISCHARGE INSTRUCTIONS
Colonoscopy     A camera attached to a flexible tube with a viewing lens is used to take video pictures.     Colonoscopy is a test to view the inside of your lower digestive tract (colon and rectum). Sometimes it can show the last part of the small intestine (ileum). During the test, small pieces of tissue may be removed for testing. This is called a biopsy. Small growths, such as polyps, may also be removed.   Why is colonoscopy done?  The test is done to help look for colon cancer. And it can help find the source of abdominal pain, bleeding, and changes in bowel habits. It may be needed once a year, depending on factors such as your:  · Age  · Health history  · Family health history  · Symptoms  · Results from any prior colonoscopy  Risks and possible complications  These include:  · Bleeding               · A puncture or tear in the colon   · Risks of anesthesia  · A cancer lesion not being seen  Getting ready   To prepare for the test:  · Talk with your healthcare provider about the risks of the test (see below). Also ask your healthcare provider about alternatives to the test.  · Tell your healthcare provider about any medicines you take. Also tell him or her about any health conditions you may have.  · Make sure your rectum and colon are empty for the test. Follow the diet and bowel prep instructions exactly. If you dont, the test may need to be rescheduled.  · Plan for a friend or family member to drive you home after the test.     Colonoscopy provides an inside view of the entire colon.     You may discuss the results with your doctor right away or at a future visit.  During the test   The test is usually done in the hospital on an outpatient basis. This means you go home the same day. The procedure takes about 30 minutes. During that time:  · You are given relaxing (sedating) medicine through an IV line. You may be drowsy, or fully asleep.  · The healthcare provider will first give you a physical exam to  check for anal and rectal problems.  · Then the anus is lubricated and the scope inserted.  · If you are awake, you may have a feeling similar to needing to have a bowel movement. You may also feel pressure as air is pumped into the colon. Its OK to pass gas during the procedure.  · Biopsy, polyp removal, or other treatments may be done during the test.  After the test   You may have gas right after the test. It can help to try to pass it to help prevent later bloating. Your healthcare provider may discuss the results with you right away. Or you may need to schedule a follow-up visit to talk about the results. After the test, you can go back to your normal eating and other activities. You may be tired from the sedation and need to rest for a few hours.  Date Last Reviewed: 11/1/2016 © 2000-2017 [employer]. [address] [place] [address], [place] [address]. All rights reserved. This information is not intended as a substitute for professional medical care. Always follow your healthcare professional's instructions.

## 2017-11-20 NOTE — H&P
Endoscopy H&P    Procedure : Colonoscopy      asymptomatic screening exam      Past Medical History:   Diagnosis Date    Hypertension              Review of Systems -ROS:  GENERAL: No fever, chills, fatigability or weight loss.  CHEST: Denies MCMAHAN, cyanosis, wheezing, cough and sputum production.  CARDIOVASCULAR: Denies chest pain, PND, orthopnea or reduced exercise tolerance.   Musculoskeletal ROS: negative for - gait disturbance or joint pain  Neurological ROS: negative for - confusion or memory loss        Physical Exam:  General: well developed, well nourished, no distress  Head: normocephalic  Neck: supple, symmetrical, trachea midline  Lungs:  clear to auscultation bilaterally and normal respiratory effort  Heart: regular rate and rhythm, S1, S2 normal, no murmur, rub or gallop and regular rate and rhythm  Abdomen: soft, non-tender non-distented; bowel sounds normal; no masses,  no organomegaly  Extremities: no cyanosis or edema, or clubbing       Moderate Sedation (choice): Mallampati Score 1    ASA : II    IMP: asymptomatic screening exam    Plan: Colonoscopy with Moderate sedation.  I have explained the procedure including indications, alternatives, expected outcomes and potential complications. The patient appears to understand and gives informed consent. The patient is medically ready for surgery.     Have seen and examined the patient with the fellow and agree with their plan.  MICHELA SOTELO

## 2017-11-20 NOTE — PATIENT INSTRUCTIONS
Discharge Summary/Instructions after an Endoscopic Procedure  Patient Name: Kar Lee  Patient MRN: [id number]  Patient YOB: 1960 Monday, November 20, 2017  Abdi Barakat MD  RESTRICTIONS:  During your procedure today, you received medications for sedation.  These   medications may affect your judgment, balance and coordination.  Therefore,   for 24 hours, you have the following restrictions:   - DO NOT drive a car, operate machinery, make legal/financial decisions,   sign important papers or drink alcohol.    ACTIVITY:  The following day: return to full activity including work, except no heavy   lifting, straining or running for 3 days if polyps were removed.  DIET:  Eat and drink normally unless instructed otherwise.     TREATMENT FOR COMMON SIDE EFFECTS:  - Mild abdominal pain, belching, bloating or excessive gas: rest, eat   lightly and use a heating pad.  - Sore Throat: treat with throat lozenges and/or gargle with warm salt   water.  SYMPTOMS TO WATCH FOR AND REPORT TO YOUR PHYSICIAN:  1. Abdominal pain or bloating, other than gas cramps.  2. Chest pain.  3. Back pain.  4. Chills or fever occurring within 24 hours after the procedure.  5. Rectal bleeding, which would show as bright red, maroon, or black stools.   (A tablespoon of blood from the rectum is not serious, especially if   hemorrhoids are present.)  6. Vomiting.  7. Weakness or dizziness.  8. Because air was used during the procedure, expelling large amounts of air   from your rectum or belching is normal.  9. If a bowel prep was taken, you may not have a bowel movement for 1-3   days.  This is normal.  GO DIRECTLY TO THE NEAREST EMERGENCY ROOM IF YOU HAVE ANY OF THE FOLLOWING:      Difficulty breathing  Chills and/or fever over 101 F   Persistent vomiting and/or vomiting blood   Severe abdominal pain   Severe chest pain   Black, tarry stools   Bleeding- more than one tablespoon   Any other symptom or condition that you may feel needs  urgent attention  Your doctor recommends these additional instructions:  If any biopsies were taken, your doctor s clinic will contact you in 1 to 2   weeks with any results.  You are being discharged to home.   Your physician has recommended a repeat colonoscopy in 10 years for   screening purposes.  For questions, problems or results please call your physician - Abdi Barakat MD at Work:  [phone].  [facility], EMERGENCY ROOM PHONE NUMBER: [phone]  IF A COMPLICATION OR EMERGENCY SITUATION ARISES AND YOU ARE UNABLE TO REACH   YOUR PHYSICIAN - GO DIRECTLY TO THE EMERGENCY ROOM.  Abdi Barakat MD  11/20/2017 11:12:17 AM  This report has been verified and signed electronically.

## 2017-11-20 NOTE — ANESTHESIA PREPROCEDURE EVALUATION
11/20/2017  Kar Lee is a 57 y.o., male.    Pre-op Assessment    I have reviewed the Patient Summary Reports.     I have reviewed the Nursing Notes.   I have reviewed the Medications.     Review of Systems  Anesthesia Hx:  No problems with previous Anesthesia    Social:  Non-Smoker, Alcohol Use    Hematology/Oncology:  Hematology Normal   Oncology Normal     EENT/Dental:EENT/Dental Normal   Cardiovascular:   Exercise tolerance: good Hypertension, well controlled    Pulmonary:  Pulmonary Normal    Renal/:  Renal/ Normal     Hepatic/GI:  Hepatic/GI Normal    Musculoskeletal:   Arthritis     Neurological:  Neurology Normal    Endocrine:  Endocrine Normal    Dermatological:  Skin Normal    Psych:  Psychiatric Normal           Physical Exam  General:  Well nourished    Airway/Jaw/Neck:  Airway Findings: Mouth Opening: Normal Tongue: Normal  General Airway Assessment: Adult, Good  Mallampati: I  TM Distance: Normal, at least 6 cm  Jaw/Neck Findings:  Neck ROM: Normal ROM      Dental:  Dental Findings: In tact        Mental Status:  Mental Status Findings:  Cooperative, Alert and Oriented         Anesthesia Plan  Type of Anesthesia, risks & benefits discussed:  Anesthesia Type:  general  Patient's Preference:   Intra-op Monitoring Plan: standard ASA monitors  Intra-op Monitoring Plan Comments:   Post Op Pain Control Plan: per primary service following discharge from PACU  Post Op Pain Control Plan Comments:   Induction:   IV  Beta Blocker:  Patient is not currently on a Beta-Blocker (No further documentation required).       Informed Consent: Patient understands risks and agrees with Anesthesia plan.  Questions answered. Anesthesia consent signed with patient.  ASA Score: 2     Day of Surgery Review of History & Physical:    H&P update referred to the provider.         Ready For Surgery From Anesthesia  Perspective.

## 2017-11-20 NOTE — TRANSFER OF CARE
"Anesthesia Transfer of Care Note    Patient: Kar Lee    Procedure(s) Performed: Procedure(s) (LRB):  COLONOSCOPY (N/A)    Patient location: GI    Anesthesia Type: general    Transport from OR: Transported from OR on room air with adequate spontaneous ventilation    Post pain: adequate analgesia    Post assessment: no apparent anesthetic complications and tolerated procedure well    Post vital signs: stable    Level of consciousness: sedated    Nausea/Vomiting: no nausea/vomiting    Complications: none    Transfer of care protocol was followed      Last vitals:   Visit Vitals  /76 (BP Location: Left arm, Patient Position: Sitting)   Pulse (!) 54   Temp 36.7 °C (98.1 °F) (Temporal)   Resp 16   Ht 5' 7" (1.702 m)   Wt 68.9 kg (152 lb)   SpO2 100%   BMI 23.81 kg/m²     "

## 2017-11-27 ENCOUNTER — TELEPHONE (OUTPATIENT)
Dept: ENDOSCOPY | Facility: HOSPITAL | Age: 57
End: 2017-11-27

## 2018-04-20 ENCOUNTER — OFFICE VISIT (OUTPATIENT)
Dept: SPORTS MEDICINE | Facility: CLINIC | Age: 58
End: 2018-04-20
Payer: COMMERCIAL

## 2018-04-20 ENCOUNTER — HOSPITAL ENCOUNTER (OUTPATIENT)
Dept: RADIOLOGY | Facility: HOSPITAL | Age: 58
Discharge: HOME | End: 2018-04-20
Attending: ORTHOPAEDIC SURGERY
Payer: COMMERCIAL

## 2018-04-20 VITALS
BODY MASS INDEX: 23.86 KG/M2 | DIASTOLIC BLOOD PRESSURE: 87 MMHG | SYSTOLIC BLOOD PRESSURE: 176 MMHG | HEIGHT: 67 IN | WEIGHT: 152 LBS | HEART RATE: 53 BPM

## 2018-04-20 DIAGNOSIS — Z98.890 STATUS POST ARTHROSCOPY OF HIP: ICD-10-CM

## 2018-04-20 DIAGNOSIS — M25.552 LEFT HIP PAIN: ICD-10-CM

## 2018-04-20 DIAGNOSIS — M25.552 LEFT HIP PAIN: Primary | ICD-10-CM

## 2018-04-20 PROCEDURE — 73503 X-RAY EXAM HIP UNI 4/> VIEWS: CPT | Mod: TC,PO,LT

## 2018-04-20 PROCEDURE — 73503 X-RAY EXAM HIP UNI 4/> VIEWS: CPT | Mod: 26,LT,, | Performed by: RADIOLOGY

## 2018-04-20 PROCEDURE — 99212 OFFICE O/P EST SF 10 MIN: CPT | Mod: S$GLB,,, | Performed by: ORTHOPAEDIC SURGERY

## 2018-04-20 PROCEDURE — 99999 PR PBB SHADOW E&M-EST. PATIENT-LVL III: CPT | Mod: PBBFAC,,, | Performed by: ORTHOPAEDIC SURGERY

## 2018-04-20 NOTE — PROGRESS NOTES
CC: S/p L hip scope 1/19/16    HISTORY OF PRESENT ILLNESS:   Pt is here today for followup of his hip arthroscopy.  he is doing well.  We have reviewed his findings and discussed plan of care and future treatment options. He runs 10-12 miles per week.    Patient notes that his hip has been doing well, he has stiffness and pain most mornings  He continues to do his HEP and stretching in the mornings as well     Feels approx 95% better now than prior to surgery.                 DATE OF PROCEDURE: 1/19/2016  PROCEDURE:    Left:  1. Hip arthroscopic labral debridement  2. Hip arthroscopic femoral neck osteoplasty  3. Hip arthroscopic partial synovectomy/debridement  4. Hip arthroscopic chondroplasty  5. 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                             Review of Systems   Constitution: Negative. Negative for chills, fever and night sweats.   HENT: Negative for congestion and headaches.    Eyes: Negative for blurred vision, left vision loss and right vision loss.   Cardiovascular: Negative for chest pain and syncope.   Respiratory: Negative for cough and shortness of breath.    Endocrine: Negative for polydipsia, polyphagia and polyuria.   Hematologic/Lymphatic: Negative for bleeding problem. Does not bruise/bleed easily.   Skin: Negative for dry skin, itching and rash.   Musculoskeletal: Negative for falls and muscle weakness.   Gastrointestinal: Negative for abdominal pain and bowel incontinence.   Genitourinary: Negative for bladder incontinence and nocturia.   Neurological: Negative for disturbances in coordination, loss of balance and seizures.   Psychiatric/Behavioral: Negative for depression. The patient does not have insomnia.   "  Allergic/Immunologic: Negative for hives and persistent infections.     PAST MEDICAL HISTORY:   Past Medical History:   Diagnosis Date    Hypertension      PAST SURGICAL HISTORY:   Past Surgical History:   Procedure Laterality Date    COLONOSCOPY N/A 11/20/2017    Procedure: COLONOSCOPY;  Surgeon: Abdi Barakat MD;  Location: [address];  Service: Endoscopy;  Laterality: N/A;    HIP SURGERY Left 1/19/2016    Dr Huston     TONSILLECTOMY       FAMILY HISTORY:   Family History   Problem Relation Age of Onset    Retinal detachment Mother     Cataracts Mother     Diabetes Mother     Hypertension Mother     Prostate cancer Father     Hypertension Father     No Known Problems Sister     No Known Problems Sister     No Known Problems Sister     No Known Problems Daughter     No Known Problems Son     No Known Problems Daughter     Glaucoma Neg Hx     Macular degeneration Neg Hx     Strabismus Neg Hx     Amblyopia Neg Hx     Blindness Neg Hx      SOCIAL HISTORY:   Social History     Social History    Marital status:      Spouse name: N/A    Number of children: N/A    Years of education: N/A     Occupational History    Not on file.     Social History Main Topics    Smoking status: Never Smoker    Smokeless tobacco: Never Used    Alcohol use 1.2 oz/week     1 Cans of beer, 1 Shots of liquor per week      Comment: occasional    Drug use: No    Sexual activity: Yes     Other Topics Concern    Not on file     Social History Narrative    No narrative on file       MEDICATIONS: No current outpatient prescriptions on file.  ALLERGIES: Review of patient's allergies indicates:  No Known Allergies    VITAL SIGNS:   BP (!) 176/87   Pulse (!) 53   Ht 5' 7" (1.702 m)   Wt 68.9 kg (152 lb)   BMI 23.81 kg/m²               PHYSICAL EXAMINATION:     Flexion ROM 0-110 degrees   IR with load 20  IR without load 30  ER 25  abd 45  Add 15  - Fadir  5/5 strength  No effusion  2+ DP pulse  No " swelling, no calf tenderness  + IT band tightness  + bridge                                                                               ASSESSMENT:                                                                                                                                               1. Status post above, doing well.                                                                                                                               PLAN:                                                                                                                                                     1. Continue HEP  2. Emphasized core function.  3. F/U in 12 months with x-rays and hip form

## 2019-07-09 ENCOUNTER — TELEPHONE (OUTPATIENT)
Dept: INTERNAL MEDICINE | Facility: CLINIC | Age: 59
End: 2019-07-09

## 2019-07-09 DIAGNOSIS — D64.9 NORMOCYTIC ANEMIA: ICD-10-CM

## 2019-07-09 DIAGNOSIS — Z00.00 ANNUAL PHYSICAL EXAM: Primary | ICD-10-CM

## 2019-07-09 NOTE — TELEPHONE ENCOUNTER
----- Message from Flora Puente sent at 7/9/2019  4:15 PM CDT -----  Contact: self   Doctor appointment and lab have been scheduled.  Please link lab orders to the lab appointment.  Date of doctor appointment:  8/9  Date of lab appointment:  8/2  Physical or EP: Physical  Comments:

## 2019-08-02 ENCOUNTER — LAB VISIT (OUTPATIENT)
Dept: LAB | Facility: HOSPITAL | Age: 59
End: 2019-08-02
Attending: INTERNAL MEDICINE
Payer: COMMERCIAL

## 2019-08-02 DIAGNOSIS — Z00.00 ANNUAL PHYSICAL EXAM: ICD-10-CM

## 2019-08-02 DIAGNOSIS — D64.9 NORMOCYTIC ANEMIA: ICD-10-CM

## 2019-08-02 LAB
ALBUMIN SERPL BCP-MCNC: 4 G/DL (ref 3.5–5.2)
ALP SERPL-CCNC: 65 U/L (ref 55–135)
ALT SERPL W/O P-5'-P-CCNC: 17 U/L (ref 10–44)
ANION GAP SERPL CALC-SCNC: 8 MMOL/L (ref 8–16)
AST SERPL-CCNC: 16 U/L (ref 10–40)
BASOPHILS # BLD AUTO: 0.02 K/UL (ref 0–0.2)
BASOPHILS NFR BLD: 0.5 % (ref 0–1.9)
BILIRUB SERPL-MCNC: 0.5 MG/DL (ref 0.1–1)
BUN SERPL-MCNC: 16 MG/DL (ref 6–20)
CALCIUM SERPL-MCNC: 9.8 MG/DL (ref 8.7–10.5)
CHLORIDE SERPL-SCNC: 105 MMOL/L (ref 95–110)
CHOLEST SERPL-MCNC: 211 MG/DL (ref 120–199)
CHOLEST/HDLC SERPL: 4.5 {RATIO} (ref 2–5)
CO2 SERPL-SCNC: 28 MMOL/L (ref 23–29)
COMPLEXED PSA SERPL-MCNC: 1.4 NG/ML (ref 0–4)
CREAT SERPL-MCNC: 1.1 MG/DL (ref 0.5–1.4)
DIFFERENTIAL METHOD: ABNORMAL
EOSINOPHIL # BLD AUTO: 0.2 K/UL (ref 0–0.5)
EOSINOPHIL NFR BLD: 4.4 % (ref 0–8)
ERYTHROCYTE [DISTWIDTH] IN BLOOD BY AUTOMATED COUNT: 12.6 % (ref 11.5–14.5)
EST. GFR  (AFRICAN AMERICAN): >60 ML/MIN/1.73 M^2
EST. GFR  (NON AFRICAN AMERICAN): >60 ML/MIN/1.73 M^2
ESTIMATED AVG GLUCOSE: 117 MG/DL (ref 68–131)
FERRITIN SERPL-MCNC: 64 NG/ML (ref 20–300)
GLUCOSE SERPL-MCNC: 106 MG/DL (ref 70–110)
HBA1C MFR BLD HPLC: 5.7 % (ref 4–5.6)
HCT VFR BLD AUTO: 45.2 % (ref 40–54)
HDLC SERPL-MCNC: 47 MG/DL (ref 40–75)
HDLC SERPL: 22.3 % (ref 20–50)
HGB BLD-MCNC: 14.5 G/DL (ref 14–18)
IMM GRANULOCYTES # BLD AUTO: 0.01 K/UL (ref 0–0.04)
IMM GRANULOCYTES NFR BLD AUTO: 0.3 % (ref 0–0.5)
IRON SERPL-MCNC: 104 UG/DL (ref 45–160)
LDLC SERPL CALC-MCNC: 141.2 MG/DL (ref 63–159)
LYMPHOCYTES # BLD AUTO: 1.5 K/UL (ref 1–4.8)
LYMPHOCYTES NFR BLD: 38.5 % (ref 18–48)
MCH RBC QN AUTO: 31.4 PG (ref 27–31)
MCHC RBC AUTO-ENTMCNC: 32.1 G/DL (ref 32–36)
MCV RBC AUTO: 98 FL (ref 82–98)
MONOCYTES # BLD AUTO: 0.4 K/UL (ref 0.3–1)
MONOCYTES NFR BLD: 9 % (ref 4–15)
NEUTROPHILS # BLD AUTO: 1.9 K/UL (ref 1.8–7.7)
NEUTROPHILS NFR BLD: 47.3 % (ref 38–73)
NONHDLC SERPL-MCNC: 164 MG/DL
NRBC BLD-RTO: 0 /100 WBC
PLATELET # BLD AUTO: 195 K/UL (ref 150–350)
PMV BLD AUTO: 11.5 FL (ref 9.2–12.9)
POTASSIUM SERPL-SCNC: 4.9 MMOL/L (ref 3.5–5.1)
PROT SERPL-MCNC: 7.2 G/DL (ref 6–8.4)
RBC # BLD AUTO: 4.62 M/UL (ref 4.6–6.2)
SATURATED IRON: 22 % (ref 20–50)
SODIUM SERPL-SCNC: 141 MMOL/L (ref 136–145)
TOTAL IRON BINDING CAPACITY: 475 UG/DL (ref 250–450)
TRANSFERRIN SERPL-MCNC: 321 MG/DL (ref 200–375)
TRIGL SERPL-MCNC: 114 MG/DL (ref 30–150)
TSH SERPL DL<=0.005 MIU/L-ACNC: 1.79 UIU/ML (ref 0.4–4)
WBC # BLD AUTO: 3.9 K/UL (ref 3.9–12.7)

## 2019-08-02 PROCEDURE — 36415 COLL VENOUS BLD VENIPUNCTURE: CPT | Mod: PO

## 2019-08-02 PROCEDURE — 80053 COMPREHEN METABOLIC PANEL: CPT

## 2019-08-02 PROCEDURE — 84443 ASSAY THYROID STIM HORMONE: CPT

## 2019-08-02 PROCEDURE — 82728 ASSAY OF FERRITIN: CPT

## 2019-08-02 PROCEDURE — 83036 HEMOGLOBIN GLYCOSYLATED A1C: CPT

## 2019-08-02 PROCEDURE — 83540 ASSAY OF IRON: CPT

## 2019-08-02 PROCEDURE — 85025 COMPLETE CBC W/AUTO DIFF WBC: CPT

## 2019-08-02 PROCEDURE — 84153 ASSAY OF PSA TOTAL: CPT

## 2019-08-02 PROCEDURE — 80061 LIPID PANEL: CPT

## 2019-08-09 ENCOUNTER — OFFICE VISIT (OUTPATIENT)
Dept: INTERNAL MEDICINE | Facility: CLINIC | Age: 59
End: 2019-08-09
Payer: COMMERCIAL

## 2019-08-09 VITALS
SYSTOLIC BLOOD PRESSURE: 152 MMHG | WEIGHT: 172.81 LBS | DIASTOLIC BLOOD PRESSURE: 84 MMHG | HEIGHT: 67 IN | RESPIRATION RATE: 18 BRPM | TEMPERATURE: 99 F | HEART RATE: 50 BPM | BODY MASS INDEX: 27.12 KG/M2

## 2019-08-09 DIAGNOSIS — I10 ESSENTIAL HYPERTENSION: ICD-10-CM

## 2019-08-09 DIAGNOSIS — R73.03 PREDIABETES: ICD-10-CM

## 2019-08-09 DIAGNOSIS — Z00.00 ANNUAL PHYSICAL EXAM: Primary | ICD-10-CM

## 2019-08-09 DIAGNOSIS — Z23 NEED FOR SHINGLES VACCINE: ICD-10-CM

## 2019-08-09 DIAGNOSIS — E78.5 HYPERLIPIDEMIA, UNSPECIFIED HYPERLIPIDEMIA TYPE: ICD-10-CM

## 2019-08-09 PROCEDURE — 99999 PR PBB SHADOW E&M-EST. PATIENT-LVL III: ICD-10-PCS | Mod: PBBFAC,,, | Performed by: INTERNAL MEDICINE

## 2019-08-09 PROCEDURE — 99396 PREV VISIT EST AGE 40-64: CPT | Mod: S$GLB,,, | Performed by: INTERNAL MEDICINE

## 2019-08-09 PROCEDURE — 99396 PR PREVENTIVE VISIT,EST,40-64: ICD-10-PCS | Mod: S$GLB,,, | Performed by: INTERNAL MEDICINE

## 2019-08-09 PROCEDURE — 99999 PR PBB SHADOW E&M-EST. PATIENT-LVL III: CPT | Mod: PBBFAC,,, | Performed by: INTERNAL MEDICINE

## 2019-08-09 RX ORDER — AMLODIPINE BESYLATE 5 MG/1
5 TABLET ORAL DAILY
Qty: 30 TABLET | Refills: 5 | Status: SHIPPED | OUTPATIENT
Start: 2019-08-09 | End: 2020-01-29

## 2019-08-09 NOTE — PROGRESS NOTES
Subjective:       Patient ID: Kar Lee is a 58 y.o. male.    Chief Complaint: Annual Exam and Fatigue    HPI    58 y.o. male here for annual exam.     Health Maintenance/ Screening:   Lipid disorders/ASCVD risk: utd    DM: A1c 5.7  Hepatitis C (0584-1827): completed   Colorectal Cancer (50-75yr): Colonoscopy 11/2017   Prostate Cancer: PSA 1.4      Vaccines:   Influenza (yearly)    Tetanus (every 10 yrs - 1st tdap) within the past 2 years    Zoster  due       Past Medical History:   Diagnosis Date    Hypertension      Past Surgical History:   Procedure Laterality Date    ARTHOSCOPIC TRIMMING-ACETABULAR RIM Left 1/19/2016    Performed by [facility]in [facility]uston [facility] at [facility] OR    ARTHROSCOPIC FEMOROPLASTY Left 1/19/2016    [facility] at [facility] OR    ARTHROSCOPIC SYNOVECTOMY-HIP / C-ARM Left 1/19/2016    [facility] OR    COLONOSCOPY N/A 11/20/2017    [facility]at [facility] ENDO (4TH FLR)    HIP SURGERY Left 1/19/2016    Dr Huston     INJECTION MAJOR JOINT Left 12/10/2015    Performed by Osman Godoy MD at [facility] PAIN MGT    TONSILLECTOMY       Family History   Problem Relation Age of Onset    Retinal detachment Mother     Cataracts Mother     Diabetes Mother     Hypertension Mother     Prostate cancer Father     Hypertension Father     No Known Problems Sister     No Known Problems Sister     No Known Problems Sister     No Known Problems Daughter     No Known Problems Son     No Known Problems Daughter     Glaucoma Neg Hx     Macular degeneration Neg Hx     Strabismus Neg Hx     Amblyopia Neg Hx     Blindness Neg Hx      Social History     Socioeconomic History    Marital status:      Spouse name: Not on file    Number of children: Not on file    Years of education: Not on file    Highest education level: Not on file   Occupational History    Not on file   Social Needs    Financial resource strain: Not very hard    Food insecurity:      Worry: Never true     Inability: Never true    Transportation needs:     Medical: No     Non-medical: No   Tobacco Use    Smoking status: Never Smoker    Smokeless tobacco: Never Used   Substance and Sexual Activity    Alcohol use: Yes     Alcohol/week: 1.2 oz     Types: 1 Cans of beer, 1 Shots of liquor per week     Frequency: 2-4 times a month     Drinks per session: 1 or 2     Binge frequency: Never     Comment: occasional    Drug use: No    Sexual activity: Yes   Lifestyle    Physical activity:     Days per week: 4 days     Minutes per session: 110 min    Stress: Only a little   Relationships    Social connections:     Talks on phone: Three times a week     Gets together: Three times a week     Attends Gnosticist service: Not on file     Active member of club or organization: Yes     Attends meetings of clubs or organizations: More than 4 times per year     Relationship status:    Other Topics Concern    Not on file   Social History Narrative    Not on file     Review of patient's allergies indicates:  No Known Allergies    Current Outpatient Medications:     amLODIPine (NORVASC) 5 MG tablet, Take 1 tablet (5 mg total) by mouth once daily., Disp: 30 tablet, Rfl: 5    varicella-zoster gE-AS01B, PF, (SHINGRIX, PF,) 50 mcg/0.5 mL injection, Inject 0.5 mLs into the muscle once. for 1 dose, Disp: 0.5 mL, Rfl: 1      Review of Systems   Constitutional: Positive for fatigue. Negative for activity change and unexpected weight change.   HENT: Negative for hearing loss, rhinorrhea and trouble swallowing.    Eyes: Negative for discharge and visual disturbance.   Respiratory: Negative for chest tightness and wheezing.    Cardiovascular: Negative for chest pain and palpitations.   Gastrointestinal: Negative for blood in stool, constipation, diarrhea and vomiting.   Endocrine: Negative for polydipsia and polyuria.   Genitourinary: Negative for difficulty urinating, hematuria and urgency.  "  Musculoskeletal: Negative for arthralgias, joint swelling and neck pain.   Neurological: Negative for syncope and headaches.   Psychiatric/Behavioral: Negative for confusion and dysphoric mood.       Objective:        Vitals:    08/09/19 1250   BP: (!) 152/84   BP Location: Right arm   Patient Position: Sitting   BP Method: Medium (Manual)   Pulse: (!) 50   Resp: 18   Temp: 98.7 °F (37.1 °C)   TempSrc: Oral   Weight: 78.4 kg (172 lb 13.5 oz)   Height: 5' 7" (1.702 m)       Body mass index is 27.07 kg/m².    Physical Exam   Constitutional: He is oriented to person, place, and time. He appears well-developed. No distress.   HENT:   Head: Normocephalic and atraumatic.   Right Ear: External ear normal.   Left Ear: External ear normal.   Nose: Nose normal.   Mouth/Throat: Oropharynx is clear and moist.   Cerumen bilaterally   Eyes: Conjunctivae and EOM are normal.   Neck: Normal range of motion. Neck supple. No tracheal deviation present. No thyromegaly present.   Cardiovascular: Normal rate, regular rhythm, normal heart sounds and intact distal pulses.   Pulmonary/Chest: Effort normal and breath sounds normal.   Abdominal: Soft. Bowel sounds are normal. He exhibits no distension. There is no tenderness.   Musculoskeletal: Normal range of motion. He exhibits no edema.   Lymphadenopathy:     He has no cervical adenopathy.   Neurological: He is alert and oriented to person, place, and time. No sensory deficit.   Skin: Skin is warm and dry. He is not diaphoretic. No cyanosis. Nails show no clubbing.   Psychiatric: He has a normal mood and affect. His behavior is normal. Judgment normal.       Assessment:     1. Annual physical exam    2. Prediabetes    3. Essential hypertension    4. Need for shingles vaccine    5. Hyperlipidemia, unspecified hyperlipidemia type           Plan:         1. Annual physical exam  - labs reviewed w/ pt    2. Prediabetes  - discussed diet and exercise. Hopefully will return to previous " activity level once fatigue/HTN treated.     3. Essential hypertension  - rtc 4 weeks w/ BP machine and log  - amLODIPine (NORVASC) 5 MG tablet; Take 1 tablet (5 mg total) by mouth once daily.  Dispense: 30 tablet; Refill: 5    4. Need for shingles vaccine  - varicella-zoster gE-AS01B, PF, (SHINGRIX, PF,) 50 mcg/0.5 mL injection; Inject 0.5 mLs into the muscle once. for 1 dose  Dispense: 0.5 mL; Refill: 1    5. Hyperlipidemia, unspecified hyperlipidemia type  - reassess ASCVD risk after HTN treated, likely continue to monitor w/ pre-DM at 6 mo f/u     rtc 1 mo f/u HTN  Then rtc 6 mo f/u pre-DM and HLD       Patient note was created using MModal Dictation.  Any errors in syntax or even information may not have been identified and edited on initial review prior to signing this note.

## 2019-09-09 ENCOUNTER — OFFICE VISIT (OUTPATIENT)
Dept: INTERNAL MEDICINE | Facility: CLINIC | Age: 59
End: 2019-09-09
Payer: COMMERCIAL

## 2019-09-09 VITALS
WEIGHT: 169.06 LBS | HEART RATE: 63 BPM | SYSTOLIC BLOOD PRESSURE: 118 MMHG | HEIGHT: 67 IN | RESPIRATION RATE: 16 BRPM | BODY MASS INDEX: 26.53 KG/M2 | TEMPERATURE: 98 F | DIASTOLIC BLOOD PRESSURE: 76 MMHG

## 2019-09-09 DIAGNOSIS — I10 ESSENTIAL HYPERTENSION: Primary | ICD-10-CM

## 2019-09-09 DIAGNOSIS — Z23 NEED FOR SHINGLES VACCINE: ICD-10-CM

## 2019-09-09 PROCEDURE — 99213 PR OFFICE/OUTPT VISIT, EST, LEVL III, 20-29 MIN: ICD-10-PCS | Mod: S$GLB,,, | Performed by: INTERNAL MEDICINE

## 2019-09-09 PROCEDURE — 99213 OFFICE O/P EST LOW 20 MIN: CPT | Mod: S$GLB,,, | Performed by: INTERNAL MEDICINE

## 2019-09-09 PROCEDURE — 99999 PR PBB SHADOW E&M-EST. PATIENT-LVL III: CPT | Mod: PBBFAC,,, | Performed by: INTERNAL MEDICINE

## 2019-09-09 PROCEDURE — 99999 PR PBB SHADOW E&M-EST. PATIENT-LVL III: ICD-10-PCS | Mod: PBBFAC,,, | Performed by: INTERNAL MEDICINE

## 2019-09-09 PROCEDURE — 3008F PR BODY MASS INDEX (BMI) DOCUMENTED: ICD-10-PCS | Mod: CPTII,S$GLB,, | Performed by: INTERNAL MEDICINE

## 2019-09-09 PROCEDURE — 3008F BODY MASS INDEX DOCD: CPT | Mod: CPTII,S$GLB,, | Performed by: INTERNAL MEDICINE

## 2019-09-09 NOTE — PROGRESS NOTES
"Subjective:       Patient ID: Kar Lee is a 58 y.o. male.    Chief Complaint: Follow-up    HPI    58 y.o. male presents for follow up of chronic medical problems including HTN    HTN - Patient is currently on amlodipine 5mg daily. He does  check his BP at home, and it runs 120s-140s/70s-90s. Side effects of medications note: none. He denies headaches, blurred vision, dizziness, chest pain, shortness of breath, nausea.    HLD: 10 year ASCVD risk 6.9%    Review of Systems   Constitutional: Negative for activity change and unexpected weight change.   HENT: Negative for hearing loss, rhinorrhea and trouble swallowing.    Eyes: Negative for discharge and visual disturbance.   Respiratory: Negative for chest tightness and wheezing.    Cardiovascular: Negative for chest pain and palpitations.   Gastrointestinal: Negative for blood in stool, constipation, diarrhea and vomiting.   Endocrine: Negative for polydipsia and polyuria.   Genitourinary: Negative for difficulty urinating, hematuria and urgency.   Musculoskeletal: Negative for arthralgias, joint swelling and neck pain.   Neurological: Negative for weakness and headaches.   Psychiatric/Behavioral: Negative for confusion and dysphoric mood.       Objective:        Vitals:    09/09/19 0836   BP: 118/76   Pulse: 63   Resp: 16   Temp: 97.7 °F (36.5 °C)   TempSrc: Oral   Weight: 76.7 kg (169 lb 1.5 oz)   Height: 5' 7" (1.702 m)       Body mass index is 26.48 kg/m².    Physical Exam   Constitutional: He is oriented to person, place, and time. He appears well-developed and well-nourished. No distress.   HENT:   Head: Normocephalic and atraumatic.   Nose: Nose normal.   Eyes: Conjunctivae and EOM are normal. Right eye exhibits no discharge. Left eye exhibits no discharge.   Neck: Normal range of motion. Neck supple.   Cardiovascular: Normal rate, regular rhythm, normal heart sounds and intact distal pulses.   Pulmonary/Chest: Effort normal and breath sounds normal. "   Musculoskeletal: Normal range of motion. He exhibits no edema.   Neurological: He is alert and oriented to person, place, and time.   Skin: Skin is warm and dry. He is not diaphoretic. No erythema.   Psychiatric: He has a normal mood and affect. His behavior is normal. Thought content normal.       Assessment:     1. Essential hypertension    2. Need for shingles vaccine           Plan:         1. Essential hypertension  - his home BP machine running about 20 points higher systolic and 10 diastolic than our manual. Continue amlodipine 5mg daily and continue to monitor home BP- if elevated or his fatigue returns, he will let me know through pt portal and will increase amlodipine to 10mg    2. Need for shingles vaccine  - varicella-zoster gE-AS01B, PF, (SHINGRIX, PF,) 50 mcg/0.5 mL injection; Inject 0.5 mLs into the muscle once. for 1 dose  Dispense: 0.5 mL; Refill: 1      rtc 6 mo f/u HTN, pre-DM, HLD       Patient note was created using MModal Dictation.  Any errors in syntax or even information may not have been identified and edited on initial review prior to signing this note.

## 2020-01-29 DIAGNOSIS — I10 ESSENTIAL HYPERTENSION: ICD-10-CM

## 2020-01-29 RX ORDER — AMLODIPINE BESYLATE 5 MG/1
TABLET ORAL
Qty: 30 TABLET | Refills: 2 | Status: SHIPPED | OUTPATIENT
Start: 2020-01-29 | End: 2020-04-27

## 2020-04-06 ENCOUNTER — PATIENT MESSAGE (OUTPATIENT)
Dept: SPORTS MEDICINE | Facility: CLINIC | Age: 60
End: 2020-04-06

## 2020-04-27 DIAGNOSIS — I10 ESSENTIAL HYPERTENSION: ICD-10-CM

## 2020-04-27 RX ORDER — AMLODIPINE BESYLATE 5 MG/1
TABLET ORAL
Qty: 30 TABLET | Refills: 2 | Status: SHIPPED | OUTPATIENT
Start: 2020-04-27 | End: 2020-07-31 | Stop reason: SDUPTHER

## 2020-07-31 DIAGNOSIS — I10 ESSENTIAL HYPERTENSION: ICD-10-CM

## 2020-07-31 RX ORDER — AMLODIPINE BESYLATE 5 MG/1
TABLET ORAL
Qty: 30 TABLET | Refills: 1 | Status: SHIPPED | OUTPATIENT
Start: 2020-07-31 | End: 2021-06-08 | Stop reason: SDUPTHER

## 2020-10-05 ENCOUNTER — PATIENT MESSAGE (OUTPATIENT)
Dept: ADMINISTRATIVE | Facility: HOSPITAL | Age: 60
End: 2020-10-05

## 2021-01-04 ENCOUNTER — PATIENT MESSAGE (OUTPATIENT)
Dept: ADMINISTRATIVE | Facility: HOSPITAL | Age: 61
End: 2021-01-04

## 2021-01-21 ENCOUNTER — PATIENT OUTREACH (OUTPATIENT)
Dept: ADMINISTRATIVE | Facility: HOSPITAL | Age: 61
End: 2021-01-21

## 2021-04-05 ENCOUNTER — PATIENT MESSAGE (OUTPATIENT)
Dept: ADMINISTRATIVE | Facility: HOSPITAL | Age: 61
End: 2021-04-05

## 2021-04-12 ENCOUNTER — PATIENT MESSAGE (OUTPATIENT)
Dept: RESEARCH | Facility: HOSPITAL | Age: 61
End: 2021-04-12

## 2021-06-08 DIAGNOSIS — I10 ESSENTIAL HYPERTENSION: ICD-10-CM

## 2021-06-08 RX ORDER — AMLODIPINE BESYLATE 5 MG/1
TABLET ORAL
Qty: 30 TABLET | Refills: 0 | Status: SHIPPED | OUTPATIENT
Start: 2021-06-08 | End: 2021-06-25 | Stop reason: SDUPTHER

## 2021-06-09 ENCOUNTER — TELEPHONE (OUTPATIENT)
Dept: INTERNAL MEDICINE | Facility: CLINIC | Age: 61
End: 2021-06-09

## 2021-06-09 DIAGNOSIS — Z00.00 ANNUAL PHYSICAL EXAM: Primary | ICD-10-CM

## 2021-06-24 ENCOUNTER — LAB VISIT (OUTPATIENT)
Dept: LAB | Facility: HOSPITAL | Age: 61
End: 2021-06-24
Attending: INTERNAL MEDICINE
Payer: COMMERCIAL

## 2021-06-24 DIAGNOSIS — Z00.00 ANNUAL PHYSICAL EXAM: ICD-10-CM

## 2021-06-24 LAB
ALBUMIN SERPL BCP-MCNC: 4.1 G/DL (ref 3.5–5.2)
ALP SERPL-CCNC: 59 U/L (ref 55–135)
ALT SERPL W/O P-5'-P-CCNC: 22 U/L (ref 10–44)
ANION GAP SERPL CALC-SCNC: 8 MMOL/L (ref 8–16)
AST SERPL-CCNC: 21 U/L (ref 10–40)
BASOPHILS # BLD AUTO: 0.02 K/UL (ref 0–0.2)
BASOPHILS NFR BLD: 0.6 % (ref 0–1.9)
BILIRUB SERPL-MCNC: 0.8 MG/DL (ref 0.1–1)
BUN SERPL-MCNC: 20 MG/DL (ref 6–20)
CALCIUM SERPL-MCNC: 9.6 MG/DL (ref 8.7–10.5)
CHLORIDE SERPL-SCNC: 106 MMOL/L (ref 95–110)
CHOLEST SERPL-MCNC: 134 MG/DL (ref 120–199)
CHOLEST/HDLC SERPL: 3.6 {RATIO} (ref 2–5)
CO2 SERPL-SCNC: 24 MMOL/L (ref 23–29)
COMPLEXED PSA SERPL-MCNC: 1.1 NG/ML (ref 0–4)
CREAT SERPL-MCNC: 1 MG/DL (ref 0.5–1.4)
DIFFERENTIAL METHOD: ABNORMAL
EOSINOPHIL # BLD AUTO: 0.1 K/UL (ref 0–0.5)
EOSINOPHIL NFR BLD: 4.2 % (ref 0–8)
ERYTHROCYTE [DISTWIDTH] IN BLOOD BY AUTOMATED COUNT: 12.6 % (ref 11.5–14.5)
EST. GFR  (AFRICAN AMERICAN): >60 ML/MIN/1.73 M^2
EST. GFR  (NON AFRICAN AMERICAN): >60 ML/MIN/1.73 M^2
ESTIMATED AVG GLUCOSE: 114 MG/DL (ref 68–131)
GLUCOSE SERPL-MCNC: 97 MG/DL (ref 70–110)
HBA1C MFR BLD: 5.6 % (ref 4–5.6)
HCT VFR BLD AUTO: 42 % (ref 40–54)
HDLC SERPL-MCNC: 37 MG/DL (ref 40–75)
HDLC SERPL: 27.6 % (ref 20–50)
HGB BLD-MCNC: 13.6 G/DL (ref 14–18)
IMM GRANULOCYTES # BLD AUTO: 0 K/UL (ref 0–0.04)
IMM GRANULOCYTES NFR BLD AUTO: 0 % (ref 0–0.5)
LDLC SERPL CALC-MCNC: 81.8 MG/DL (ref 63–159)
LYMPHOCYTES # BLD AUTO: 1.3 K/UL (ref 1–4.8)
LYMPHOCYTES NFR BLD: 41.2 % (ref 18–48)
MCH RBC QN AUTO: 30.4 PG (ref 27–31)
MCHC RBC AUTO-ENTMCNC: 32.4 G/DL (ref 32–36)
MCV RBC AUTO: 94 FL (ref 82–98)
MONOCYTES # BLD AUTO: 0.4 K/UL (ref 0.3–1)
MONOCYTES NFR BLD: 11.3 % (ref 4–15)
NEUTROPHILS # BLD AUTO: 1.3 K/UL (ref 1.8–7.7)
NEUTROPHILS NFR BLD: 42.7 % (ref 38–73)
NONHDLC SERPL-MCNC: 97 MG/DL
NRBC BLD-RTO: 0 /100 WBC
PLATELET # BLD AUTO: 192 K/UL (ref 150–450)
PMV BLD AUTO: 11.4 FL (ref 9.2–12.9)
POTASSIUM SERPL-SCNC: 4.8 MMOL/L (ref 3.5–5.1)
PROT SERPL-MCNC: 6.9 G/DL (ref 6–8.4)
RBC # BLD AUTO: 4.48 M/UL (ref 4.6–6.2)
SODIUM SERPL-SCNC: 138 MMOL/L (ref 136–145)
TRIGL SERPL-MCNC: 76 MG/DL (ref 30–150)
TSH SERPL DL<=0.005 MIU/L-ACNC: 1.62 UIU/ML (ref 0.4–4)
WBC # BLD AUTO: 3.11 K/UL (ref 3.9–12.7)

## 2021-06-24 PROCEDURE — 80061 LIPID PANEL: CPT | Performed by: INTERNAL MEDICINE

## 2021-06-24 PROCEDURE — 84153 ASSAY OF PSA TOTAL: CPT | Performed by: INTERNAL MEDICINE

## 2021-06-24 PROCEDURE — 84443 ASSAY THYROID STIM HORMONE: CPT | Performed by: INTERNAL MEDICINE

## 2021-06-24 PROCEDURE — 85025 COMPLETE CBC W/AUTO DIFF WBC: CPT | Performed by: INTERNAL MEDICINE

## 2021-06-24 PROCEDURE — 36415 COLL VENOUS BLD VENIPUNCTURE: CPT | Mod: PO | Performed by: INTERNAL MEDICINE

## 2021-06-24 PROCEDURE — 80053 COMPREHEN METABOLIC PANEL: CPT | Performed by: INTERNAL MEDICINE

## 2021-06-24 PROCEDURE — 83036 HEMOGLOBIN GLYCOSYLATED A1C: CPT | Performed by: INTERNAL MEDICINE

## 2021-06-25 DIAGNOSIS — I10 ESSENTIAL HYPERTENSION: ICD-10-CM

## 2021-06-27 RX ORDER — AMLODIPINE BESYLATE 5 MG/1
TABLET ORAL
Qty: 30 TABLET | Refills: 0 | Status: SHIPPED | OUTPATIENT
Start: 2021-06-27 | End: 2021-06-30 | Stop reason: SDUPTHER

## 2021-06-30 ENCOUNTER — OFFICE VISIT (OUTPATIENT)
Dept: INTERNAL MEDICINE | Facility: CLINIC | Age: 61
End: 2021-06-30
Payer: COMMERCIAL

## 2021-06-30 VITALS
SYSTOLIC BLOOD PRESSURE: 130 MMHG | HEART RATE: 60 BPM | RESPIRATION RATE: 18 BRPM | DIASTOLIC BLOOD PRESSURE: 82 MMHG | OXYGEN SATURATION: 97 % | TEMPERATURE: 98 F | BODY MASS INDEX: 28.24 KG/M2 | HEIGHT: 64 IN | WEIGHT: 165.38 LBS

## 2021-06-30 DIAGNOSIS — Z00.00 ANNUAL PHYSICAL EXAM: Primary | ICD-10-CM

## 2021-06-30 DIAGNOSIS — I10 ESSENTIAL HYPERTENSION: ICD-10-CM

## 2021-06-30 DIAGNOSIS — Z23 NEED FOR DIPHTHERIA-TETANUS-PERTUSSIS (TDAP) VACCINE: ICD-10-CM

## 2021-06-30 PROCEDURE — 3008F PR BODY MASS INDEX (BMI) DOCUMENTED: ICD-10-PCS | Mod: CPTII,S$GLB,, | Performed by: INTERNAL MEDICINE

## 2021-06-30 PROCEDURE — 90471 IMMUNIZATION ADMIN: CPT | Mod: S$GLB,,, | Performed by: INTERNAL MEDICINE

## 2021-06-30 PROCEDURE — 90715 TDAP VACCINE 7 YRS/> IM: CPT | Mod: S$GLB,,, | Performed by: INTERNAL MEDICINE

## 2021-06-30 PROCEDURE — 1126F PR PAIN SEVERITY QUANTIFIED, NO PAIN PRESENT: ICD-10-PCS | Mod: S$GLB,,, | Performed by: INTERNAL MEDICINE

## 2021-06-30 PROCEDURE — 99396 PR PREVENTIVE VISIT,EST,40-64: ICD-10-PCS | Mod: 25,S$GLB,, | Performed by: INTERNAL MEDICINE

## 2021-06-30 PROCEDURE — 1126F AMNT PAIN NOTED NONE PRSNT: CPT | Mod: S$GLB,,, | Performed by: INTERNAL MEDICINE

## 2021-06-30 PROCEDURE — 3008F BODY MASS INDEX DOCD: CPT | Mod: CPTII,S$GLB,, | Performed by: INTERNAL MEDICINE

## 2021-06-30 PROCEDURE — 99999 PR PBB SHADOW E&M-EST. PATIENT-LVL III: CPT | Mod: PBBFAC,,, | Performed by: INTERNAL MEDICINE

## 2021-06-30 PROCEDURE — 90471 TDAP VACCINE GREATER THAN OR EQUAL TO 7YO IM: ICD-10-PCS | Mod: S$GLB,,, | Performed by: INTERNAL MEDICINE

## 2021-06-30 PROCEDURE — 99396 PREV VISIT EST AGE 40-64: CPT | Mod: 25,S$GLB,, | Performed by: INTERNAL MEDICINE

## 2021-06-30 PROCEDURE — 99999 PR PBB SHADOW E&M-EST. PATIENT-LVL III: ICD-10-PCS | Mod: PBBFAC,,, | Performed by: INTERNAL MEDICINE

## 2021-06-30 PROCEDURE — 90715 TDAP VACCINE GREATER THAN OR EQUAL TO 7YO IM: ICD-10-PCS | Mod: S$GLB,,, | Performed by: INTERNAL MEDICINE

## 2021-06-30 RX ORDER — AMLODIPINE BESYLATE 5 MG/1
TABLET ORAL
Qty: 90 TABLET | Refills: 3 | Status: SHIPPED | OUTPATIENT
Start: 2021-06-30 | End: 2022-06-23 | Stop reason: SDUPTHER

## 2021-06-30 RX ORDER — ASPIRIN 81 MG/1
81 TABLET ORAL DAILY
COMMUNITY

## 2021-07-01 ENCOUNTER — PATIENT MESSAGE (OUTPATIENT)
Dept: INTERNAL MEDICINE | Facility: CLINIC | Age: 61
End: 2021-07-01

## 2022-04-20 ENCOUNTER — TELEPHONE (OUTPATIENT)
Dept: INTERNAL MEDICINE | Facility: CLINIC | Age: 62
End: 2022-04-20
Payer: COMMERCIAL

## 2022-04-20 DIAGNOSIS — D64.9 NORMOCYTIC ANEMIA: ICD-10-CM

## 2022-04-20 DIAGNOSIS — Z00.00 ANNUAL PHYSICAL EXAM: Primary | ICD-10-CM

## 2022-04-20 NOTE — TELEPHONE ENCOUNTER
----- Message from Teagan Ridley sent at 3/29/2022  3:18 PM CDT -----  Doctor appointment and lab have been scheduled.  Please link lab orders to the lab appointment.  Date of doctor appointment:  7/18  Date of lab appointment:  7/11  Physical or F/U: physical  Comments:

## 2022-06-23 DIAGNOSIS — I10 ESSENTIAL HYPERTENSION: ICD-10-CM

## 2022-06-23 RX ORDER — AMLODIPINE BESYLATE 5 MG/1
TABLET ORAL
Qty: 90 TABLET | Refills: 0 | Status: SHIPPED | OUTPATIENT
Start: 2022-06-23 | End: 2022-08-16

## 2022-06-23 NOTE — TELEPHONE ENCOUNTER
No new care gaps identified.  [facility] Embedded Care Gaps. Reference number: [id number]. 6/23/2022   2:54:43 PM CDT

## 2022-07-11 ENCOUNTER — LAB VISIT (OUTPATIENT)
Dept: LAB | Facility: HOSPITAL | Age: 62
End: 2022-07-11
Attending: INTERNAL MEDICINE
Payer: COMMERCIAL

## 2022-07-11 DIAGNOSIS — D64.9 NORMOCYTIC ANEMIA: ICD-10-CM

## 2022-07-11 DIAGNOSIS — Z00.00 ANNUAL PHYSICAL EXAM: ICD-10-CM

## 2022-07-11 LAB
ALBUMIN SERPL BCP-MCNC: 3.8 G/DL (ref 3.5–5.2)
ALP SERPL-CCNC: 55 U/L (ref 55–135)
ALT SERPL W/O P-5'-P-CCNC: 23 U/L (ref 10–44)
ANION GAP SERPL CALC-SCNC: 9 MMOL/L (ref 8–16)
AST SERPL-CCNC: 23 U/L (ref 10–40)
BASOPHILS # BLD AUTO: 0.04 K/UL (ref 0–0.2)
BASOPHILS NFR BLD: 0.9 % (ref 0–1.9)
BILIRUB SERPL-MCNC: 0.8 MG/DL (ref 0.1–1)
BUN SERPL-MCNC: 14 MG/DL (ref 6–20)
CALCIUM SERPL-MCNC: 8.9 MG/DL (ref 8.7–10.5)
CHLORIDE SERPL-SCNC: 102 MMOL/L (ref 95–110)
CHOLEST SERPL-MCNC: 209 MG/DL (ref 120–199)
CHOLEST/HDLC SERPL: 4.5 {RATIO} (ref 2–5)
CO2 SERPL-SCNC: 25 MMOL/L (ref 23–29)
COMPLEXED PSA SERPL-MCNC: 1.5 NG/ML (ref 0–4)
CREAT SERPL-MCNC: 0.9 MG/DL (ref 0.5–1.4)
DIFFERENTIAL METHOD: ABNORMAL
EOSINOPHIL # BLD AUTO: 0.2 K/UL (ref 0–0.5)
EOSINOPHIL NFR BLD: 5 % (ref 0–8)
ERYTHROCYTE [DISTWIDTH] IN BLOOD BY AUTOMATED COUNT: 12.3 % (ref 11.5–14.5)
EST. GFR  (AFRICAN AMERICAN): >60 ML/MIN/1.73 M^2
EST. GFR  (NON AFRICAN AMERICAN): >60 ML/MIN/1.73 M^2
ESTIMATED AVG GLUCOSE: 117 MG/DL (ref 68–131)
FERRITIN SERPL-MCNC: 63 NG/ML (ref 20–300)
GLUCOSE SERPL-MCNC: 110 MG/DL (ref 70–110)
HBA1C MFR BLD: 5.7 % (ref 4–5.6)
HCT VFR BLD AUTO: 40.9 % (ref 40–54)
HDLC SERPL-MCNC: 46 MG/DL (ref 40–75)
HDLC SERPL: 22 % (ref 20–50)
HGB BLD-MCNC: 13.4 G/DL (ref 14–18)
IMM GRANULOCYTES # BLD AUTO: 0.01 K/UL (ref 0–0.04)
IMM GRANULOCYTES NFR BLD AUTO: 0.2 % (ref 0–0.5)
IRON SERPL-MCNC: 146 UG/DL (ref 45–160)
LDLC SERPL CALC-MCNC: 133.2 MG/DL (ref 63–159)
LYMPHOCYTES # BLD AUTO: 1.7 K/UL (ref 1–4.8)
LYMPHOCYTES NFR BLD: 35.7 % (ref 18–48)
MCH RBC QN AUTO: 31.3 PG (ref 27–31)
MCHC RBC AUTO-ENTMCNC: 32.8 G/DL (ref 32–36)
MCV RBC AUTO: 96 FL (ref 82–98)
MONOCYTES # BLD AUTO: 0.4 K/UL (ref 0.3–1)
MONOCYTES NFR BLD: 9.3 % (ref 4–15)
NEUTROPHILS # BLD AUTO: 2.3 K/UL (ref 1.8–7.7)
NEUTROPHILS NFR BLD: 48.9 % (ref 38–73)
NONHDLC SERPL-MCNC: 163 MG/DL
NRBC BLD-RTO: 0 /100 WBC
PLATELET # BLD AUTO: 199 K/UL (ref 150–450)
PMV BLD AUTO: 11.6 FL (ref 9.2–12.9)
POTASSIUM SERPL-SCNC: 4.2 MMOL/L (ref 3.5–5.1)
PROT SERPL-MCNC: 6.7 G/DL (ref 6–8.4)
RBC # BLD AUTO: 4.28 M/UL (ref 4.6–6.2)
SATURATED IRON: 32 % (ref 20–50)
SODIUM SERPL-SCNC: 136 MMOL/L (ref 136–145)
TOTAL IRON BINDING CAPACITY: 453 UG/DL (ref 250–450)
TRANSFERRIN SERPL-MCNC: 306 MG/DL (ref 200–375)
TRIGL SERPL-MCNC: 149 MG/DL (ref 30–150)
TSH SERPL DL<=0.005 MIU/L-ACNC: 1.32 UIU/ML (ref 0.4–4)
WBC # BLD AUTO: 4.62 K/UL (ref 3.9–12.7)

## 2022-07-11 PROCEDURE — 84443 ASSAY THYROID STIM HORMONE: CPT | Performed by: INTERNAL MEDICINE

## 2022-07-11 PROCEDURE — 82728 ASSAY OF FERRITIN: CPT | Performed by: INTERNAL MEDICINE

## 2022-07-11 PROCEDURE — 80053 COMPREHEN METABOLIC PANEL: CPT | Performed by: INTERNAL MEDICINE

## 2022-07-11 PROCEDURE — 85025 COMPLETE CBC W/AUTO DIFF WBC: CPT | Performed by: INTERNAL MEDICINE

## 2022-07-11 PROCEDURE — 36415 COLL VENOUS BLD VENIPUNCTURE: CPT | Mod: PO | Performed by: INTERNAL MEDICINE

## 2022-07-11 PROCEDURE — 80061 LIPID PANEL: CPT | Performed by: INTERNAL MEDICINE

## 2022-07-11 PROCEDURE — 83036 HEMOGLOBIN GLYCOSYLATED A1C: CPT | Performed by: INTERNAL MEDICINE

## 2022-07-11 PROCEDURE — 84466 ASSAY OF TRANSFERRIN: CPT | Performed by: INTERNAL MEDICINE

## 2022-07-11 PROCEDURE — 84153 ASSAY OF PSA TOTAL: CPT | Performed by: INTERNAL MEDICINE

## 2022-07-22 ENCOUNTER — TELEPHONE (OUTPATIENT)
Dept: INTERNAL MEDICINE | Facility: CLINIC | Age: 62
End: 2022-07-22
Payer: COMMERCIAL

## 2022-07-22 NOTE — TELEPHONE ENCOUNTER
----- Message from Trasha Rapp sent at 7/21/2022  3:51 PM CDT -----  Contact: ANNELISE URBAN [[id number]]@ [phone]  Patient upset that this is the 2nd time his appointment has been cancelled. He would like a call back . Next appointment is in November.

## 2022-07-27 ENCOUNTER — OFFICE VISIT (OUTPATIENT)
Dept: INTERNAL MEDICINE | Facility: CLINIC | Age: 62
End: 2022-07-27
Payer: COMMERCIAL

## 2022-07-27 VITALS
SYSTOLIC BLOOD PRESSURE: 152 MMHG | HEART RATE: 57 BPM | WEIGHT: 172.63 LBS | RESPIRATION RATE: 16 BRPM | DIASTOLIC BLOOD PRESSURE: 78 MMHG | HEIGHT: 64 IN | TEMPERATURE: 97 F | OXYGEN SATURATION: 100 % | BODY MASS INDEX: 29.47 KG/M2

## 2022-07-27 DIAGNOSIS — R73.03 PREDIABETES: ICD-10-CM

## 2022-07-27 DIAGNOSIS — E78.00 PURE HYPERCHOLESTEROLEMIA: ICD-10-CM

## 2022-07-27 DIAGNOSIS — Z00.00 ANNUAL PHYSICAL EXAM: Primary | ICD-10-CM

## 2022-07-27 DIAGNOSIS — Z13.6 ENCOUNTER FOR SCREENING FOR CARDIOVASCULAR DISORDERS: ICD-10-CM

## 2022-07-27 DIAGNOSIS — M16.12 PRIMARY OSTEOARTHRITIS OF LEFT HIP: ICD-10-CM

## 2022-07-27 DIAGNOSIS — I10 ESSENTIAL HYPERTENSION: ICD-10-CM

## 2022-07-27 PROCEDURE — 3077F SYST BP >= 140 MM HG: CPT | Mod: CPTII,S$GLB,, | Performed by: STUDENT IN AN ORGANIZED HEALTH CARE EDUCATION/TRAINING PROGRAM

## 2022-07-27 PROCEDURE — 99396 PR PREVENTIVE VISIT,EST,40-64: ICD-10-PCS | Mod: S$GLB,,, | Performed by: STUDENT IN AN ORGANIZED HEALTH CARE EDUCATION/TRAINING PROGRAM

## 2022-07-27 PROCEDURE — 99999 PR PBB SHADOW E&M-EST. PATIENT-LVL IV: CPT | Mod: PBBFAC,,, | Performed by: STUDENT IN AN ORGANIZED HEALTH CARE EDUCATION/TRAINING PROGRAM

## 2022-07-27 PROCEDURE — 3077F PR MOST RECENT SYSTOLIC BLOOD PRESSURE >= 140 MM HG: ICD-10-PCS | Mod: CPTII,S$GLB,, | Performed by: STUDENT IN AN ORGANIZED HEALTH CARE EDUCATION/TRAINING PROGRAM

## 2022-07-27 PROCEDURE — 99999 PR PBB SHADOW E&M-EST. PATIENT-LVL IV: ICD-10-PCS | Mod: PBBFAC,,, | Performed by: STUDENT IN AN ORGANIZED HEALTH CARE EDUCATION/TRAINING PROGRAM

## 2022-07-27 PROCEDURE — 1160F PR REVIEW ALL MEDS BY PRESCRIBER/CLIN PHARMACIST DOCUMENTED: ICD-10-PCS | Mod: CPTII,S$GLB,, | Performed by: STUDENT IN AN ORGANIZED HEALTH CARE EDUCATION/TRAINING PROGRAM

## 2022-07-27 PROCEDURE — 3078F DIAST BP <80 MM HG: CPT | Mod: CPTII,S$GLB,, | Performed by: STUDENT IN AN ORGANIZED HEALTH CARE EDUCATION/TRAINING PROGRAM

## 2022-07-27 PROCEDURE — 3044F PR MOST RECENT HEMOGLOBIN A1C LEVEL <7.0%: ICD-10-PCS | Mod: CPTII,S$GLB,, | Performed by: STUDENT IN AN ORGANIZED HEALTH CARE EDUCATION/TRAINING PROGRAM

## 2022-07-27 PROCEDURE — 1159F PR MEDICATION LIST DOCUMENTED IN MEDICAL RECORD: ICD-10-PCS | Mod: CPTII,S$GLB,, | Performed by: STUDENT IN AN ORGANIZED HEALTH CARE EDUCATION/TRAINING PROGRAM

## 2022-07-27 PROCEDURE — 3078F PR MOST RECENT DIASTOLIC BLOOD PRESSURE < 80 MM HG: ICD-10-PCS | Mod: CPTII,S$GLB,, | Performed by: STUDENT IN AN ORGANIZED HEALTH CARE EDUCATION/TRAINING PROGRAM

## 2022-07-27 PROCEDURE — 3044F HG A1C LEVEL LT 7.0%: CPT | Mod: CPTII,S$GLB,, | Performed by: STUDENT IN AN ORGANIZED HEALTH CARE EDUCATION/TRAINING PROGRAM

## 2022-07-27 PROCEDURE — 1159F MED LIST DOCD IN RCRD: CPT | Mod: CPTII,S$GLB,, | Performed by: STUDENT IN AN ORGANIZED HEALTH CARE EDUCATION/TRAINING PROGRAM

## 2022-07-27 PROCEDURE — 3008F BODY MASS INDEX DOCD: CPT | Mod: CPTII,S$GLB,, | Performed by: STUDENT IN AN ORGANIZED HEALTH CARE EDUCATION/TRAINING PROGRAM

## 2022-07-27 PROCEDURE — 1160F RVW MEDS BY RX/DR IN RCRD: CPT | Mod: CPTII,S$GLB,, | Performed by: STUDENT IN AN ORGANIZED HEALTH CARE EDUCATION/TRAINING PROGRAM

## 2022-07-27 PROCEDURE — 99396 PREV VISIT EST AGE 40-64: CPT | Mod: S$GLB,,, | Performed by: STUDENT IN AN ORGANIZED HEALTH CARE EDUCATION/TRAINING PROGRAM

## 2022-07-27 PROCEDURE — 3008F PR BODY MASS INDEX (BMI) DOCUMENTED: ICD-10-PCS | Mod: CPTII,S$GLB,, | Performed by: STUDENT IN AN ORGANIZED HEALTH CARE EDUCATION/TRAINING PROGRAM

## 2022-07-27 RX ORDER — ATORVASTATIN CALCIUM 10 MG/1
10 TABLET, FILM COATED ORAL DAILY
Qty: 90 TABLET | Refills: 3 | Status: SHIPPED | OUTPATIENT
Start: 2022-07-27 | End: 2023-08-11 | Stop reason: SDUPTHER

## 2022-07-27 NOTE — PROGRESS NOTES
"Clinic Note  7/27/2022      Subjective:      Chief Complaint: Annual exam  HPI: The patient is a 61 y.o. male with HTN, prediabetes, osteoarthritis, and hx of normocytic anemia being seen for an annual physical exam.    Non smoker  ETOH socially    HEALTH MAINTENANCE:  Cholesterol/labs: UTD  ASCVD: 13.4%, not on statin therapy  HIV: UTD  HCV: UTD  Colon CA screening: UTD, 2017, 10 year follow up  EYE exam: due    VACCINATIONS:  TD/TDAP: UTD, 2021  COVID-19: due for booster. Discussed, patient not planning to get it  Shingles (>50): UTD    HTN: Not recently checking BPs. Several months ago, home blood pressures were in the 130s systolic.   He has had some stress over the past couple months with a new job.     Review of Systems   Constitutional: Negative for diaphoresis and fever.   HENT: Negative for congestion and sore throat.    Eyes: Negative for blurred vision and discharge.   Respiratory: Negative for cough and shortness of breath.    Cardiovascular: Negative for chest pain and palpitations.   Gastrointestinal: Negative for nausea and vomiting.   Genitourinary: Negative for dysuria and hematuria.   Musculoskeletal: Negative for joint pain and myalgias.   Skin: Negative for itching and rash.   Neurological: Negative for weakness and headaches.       Medication List with Changes/Refills   Current Medications    AMLODIPINE (NORVASC) 5 MG TABLET    TAKE 1 TABLET(5 MG) BY MOUTH EVERY DAY    ASPIRIN (ECOTRIN) 81 MG EC TABLET    Take 81 mg by mouth once daily.       Patient Active Problem List   Diagnosis    Left hip pain    Hip osteoarthritis    Status post arthroscopy of hip    Normocytic anemia    Essential hypertension    Prediabetes           Objective:      BP (!) 152/78   Pulse (!) 57   Temp 97 °F (36.1 °C) (Oral)   Resp 16   Ht 5' 4" (1.626 m)   Wt 78.3 kg (172 lb 9.6 oz)   SpO2 100%   BMI 29.63 kg/m²   Estimated body mass index is 28.38 kg/m² as calculated from the following:    Height as of " "6/30/21: 5' 4" (1.626 m).    Weight as of 6/30/21: 75 kg (165 lb 5.5 oz).  Physical Exam  Vitals reviewed.   HENT:      Head: Normocephalic and atraumatic.      Mouth/Throat:      Mouth: Mucous membranes are moist.      Pharynx: Oropharynx is clear.   Eyes:      Pupils: Pupils are equal, round, and reactive to light.   Cardiovascular:      Rate and Rhythm: Regular rhythm. Bradycardia present.      Heart sounds: Normal heart sounds.   Pulmonary:      Effort: Pulmonary effort is normal. No respiratory distress.      Breath sounds: Normal breath sounds. No rales.   Abdominal:      General: Bowel sounds are normal.      Palpations: Abdomen is soft.      Tenderness: There is no abdominal tenderness. There is no guarding.   Musculoskeletal:         General: No swelling or tenderness. Normal range of motion.      Cervical back: Normal range of motion and neck supple. No rigidity.   Skin:     General: Skin is warm and dry.   Neurological:      General: No focal deficit present.      Mental Status: He is alert and oriented to person, place, and time.   Psychiatric:         Mood and Affect: Mood normal.         Thought Content: Thought content normal.           Assessment and Plan:     Annual physical exam  Care gaps closed  Anemia stable, colonoscopy UTD  Vaccines UTD, due for COVID booster but declined at this time  Patient will schedule eye exam    Essential hypertension         -     Continue amlodipine 5mg daily for now. Blood pressure diary and will increase amlodipine dosing if averaging >130/80    Prediabetes  Stable, diet and exercise as below    Primary osteoarthritis of left hip  Stable    Pure hypercholesterolemia  Encounter for screening for cardiovascular disorders  ASCVD 13.4%, HTN, and father with an MI in his 60s. Patient with risk factors and would like CT cardiac calcium scoring. We will start moderate intensity statin at this time and if high calcium score, can increase to high intensity statin. Educated " patient to reduce cardiovascular risk. Continue exercise, dieting (talked about low cholesterol, low carb diet), and targeting risk factors.    -     Lipid Panel; Future; Expected date: 10/27/2023  -     Hepatic Function Panel; Future; Expected date: 10/27/2022  -     CT Cardiac Scoring; Future; Expected date: 0/27/2022  -     atorvastatin (LIPITOR) 10 MG tablet; Take 1 tablet (10 mg total) by mouth once daily.  Dispense: 90 tablet; Refill: 3      Follow Up:   Follow up in about 1 year (around 7/27/2023). Recall placed.    Connor Gillies, DO  PGY-3, Internal Medicine  [facility]  [address] [place] [address]

## 2022-07-28 ENCOUNTER — TELEPHONE (OUTPATIENT)
Dept: INTERNAL MEDICINE | Facility: CLINIC | Age: 62
End: 2022-07-28
Payer: COMMERCIAL

## 2022-07-29 ENCOUNTER — TELEPHONE (OUTPATIENT)
Dept: INTERNAL MEDICINE | Facility: CLINIC | Age: 62
End: 2022-07-29
Payer: COMMERCIAL

## 2022-07-29 ENCOUNTER — HOSPITAL ENCOUNTER (OUTPATIENT)
Dept: RADIOLOGY | Facility: HOSPITAL | Age: 62
Discharge: HOME | End: 2022-07-29
Attending: STUDENT IN AN ORGANIZED HEALTH CARE EDUCATION/TRAINING PROGRAM
Payer: COMMERCIAL

## 2022-07-29 DIAGNOSIS — Z13.6 ENCOUNTER FOR SCREENING FOR CARDIOVASCULAR DISORDERS: ICD-10-CM

## 2022-07-29 DIAGNOSIS — E78.00 PURE HYPERCHOLESTEROLEMIA: ICD-10-CM

## 2022-07-29 PROCEDURE — 75571 CT HRT W/O DYE W/CA TEST: CPT | Mod: TC

## 2022-07-29 PROCEDURE — 75571 CT HRT W/O DYE W/CA TEST: CPT | Mod: 26,,, | Performed by: RADIOLOGY

## 2022-07-29 PROCEDURE — 75571 CT CALCIUM SCORING CARDIAC: ICD-10-PCS | Mod: 26,,, | Performed by: RADIOLOGY

## 2022-07-29 NOTE — TELEPHONE ENCOUNTER
Discussed coronary calcium score and cardiovascular risk with the patient on the phone. He is moderate risk and per his coronary calcium score, prediabetes, family history of CAD, and ASCVD of 13.4%, we are going to target an LDL of <70 for him for primary prevention.     Discussed options of moderate versus high intensity statin therapy. He would like to try lifestyle modifications and moderate intensity statin for now. He gained weight and has been off his normal diet since [place] last fall and will be getting back onboard prior to his next lab check in 11-12 weeks.     Plan:  - Continue 10mg for now  - Lifestyle modifications  - Repeat lipid, CMP in 3 months  - If LDL >70 at that time, patient okay with increasing Lipitor to 20mg daily    Connor M. Gillies, DO  PGY-3  Internal Medicine

## 2022-08-11 ENCOUNTER — PATIENT MESSAGE (OUTPATIENT)
Dept: ADMINISTRATIVE | Facility: HOSPITAL | Age: 62
End: 2022-08-11
Payer: COMMERCIAL

## 2022-08-11 ENCOUNTER — PATIENT OUTREACH (OUTPATIENT)
Dept: ADMINISTRATIVE | Facility: HOSPITAL | Age: 62
End: 2022-08-11
Payer: COMMERCIAL

## 2022-08-11 DIAGNOSIS — I10 ESSENTIAL HYPERTENSION: Primary | ICD-10-CM

## 2022-08-15 ENCOUNTER — TELEPHONE (OUTPATIENT)
Dept: INTERNAL MEDICINE | Facility: CLINIC | Age: 62
End: 2022-08-15
Payer: COMMERCIAL

## 2022-08-15 VITALS — SYSTOLIC BLOOD PRESSURE: 114 MMHG | DIASTOLIC BLOOD PRESSURE: 69 MMHG

## 2022-08-16 RX ORDER — AMLODIPINE BESYLATE 10 MG/1
10 TABLET ORAL DAILY
Qty: 90 TABLET | Refills: 0 | Status: SHIPPED | OUTPATIENT
Start: 2022-08-16 | End: 2022-11-10 | Stop reason: SDUPTHER

## 2022-10-27 ENCOUNTER — LAB VISIT (OUTPATIENT)
Dept: LAB | Facility: HOSPITAL | Age: 62
End: 2022-10-27
Payer: COMMERCIAL

## 2022-10-27 DIAGNOSIS — E78.00 PURE HYPERCHOLESTEROLEMIA: ICD-10-CM

## 2022-10-27 LAB
ALBUMIN SERPL BCP-MCNC: 4 G/DL (ref 3.5–5.2)
ALP SERPL-CCNC: 66 U/L (ref 55–135)
ALT SERPL W/O P-5'-P-CCNC: 27 U/L (ref 10–44)
ANION GAP SERPL CALC-SCNC: 10 MMOL/L (ref 8–16)
AST SERPL-CCNC: 28 U/L (ref 10–40)
BILIRUB SERPL-MCNC: 0.7 MG/DL (ref 0.1–1)
BUN SERPL-MCNC: 16 MG/DL (ref 6–20)
CALCIUM SERPL-MCNC: 9.7 MG/DL (ref 8.7–10.5)
CHLORIDE SERPL-SCNC: 105 MMOL/L (ref 95–110)
CHOLEST SERPL-MCNC: 131 MG/DL (ref 120–199)
CHOLEST/HDLC SERPL: 2.8 {RATIO} (ref 2–5)
CO2 SERPL-SCNC: 23 MMOL/L (ref 23–29)
CREAT SERPL-MCNC: 1 MG/DL (ref 0.5–1.4)
EST. GFR  (NO RACE VARIABLE): >60 ML/MIN/1.73 M^2
GLUCOSE SERPL-MCNC: 114 MG/DL (ref 70–110)
HDLC SERPL-MCNC: 46 MG/DL (ref 40–75)
HDLC SERPL: 35.1 % (ref 20–50)
LDLC SERPL CALC-MCNC: 66.6 MG/DL (ref 63–159)
NONHDLC SERPL-MCNC: 85 MG/DL
POTASSIUM SERPL-SCNC: 4.6 MMOL/L (ref 3.5–5.1)
PROT SERPL-MCNC: 7 G/DL (ref 6–8.4)
SODIUM SERPL-SCNC: 138 MMOL/L (ref 136–145)
TRIGL SERPL-MCNC: 92 MG/DL (ref 30–150)

## 2022-10-27 PROCEDURE — 80061 LIPID PANEL: CPT | Performed by: STUDENT IN AN ORGANIZED HEALTH CARE EDUCATION/TRAINING PROGRAM

## 2022-10-27 PROCEDURE — 36415 COLL VENOUS BLD VENIPUNCTURE: CPT | Mod: PO | Performed by: STUDENT IN AN ORGANIZED HEALTH CARE EDUCATION/TRAINING PROGRAM

## 2022-10-27 PROCEDURE — 80053 COMPREHEN METABOLIC PANEL: CPT | Performed by: STUDENT IN AN ORGANIZED HEALTH CARE EDUCATION/TRAINING PROGRAM

## 2022-11-10 DIAGNOSIS — I10 ESSENTIAL HYPERTENSION: ICD-10-CM

## 2022-11-10 NOTE — TELEPHONE ENCOUNTER
Care Due:                  Date            Visit Type   Department     Provider  --------------------------------------------------------------------------------                                MYCHART                              ANNUAL                              CHECKUP/PHY  MET INTERNAL  Last Visit: 06-      [facility]       Mary Esposito  Next Visit: None Scheduled  None         None Found                                                            Last  Test          Frequency    Reason                     Performed    Due Date  --------------------------------------------------------------------------------    Office Visit  12 months..  amLODIPine...............  06- 06-    [facility] Embedded Care Gaps. Reference number: [id number]. 11/10/2022   4:23:05 PM CST

## 2022-11-11 RX ORDER — AMLODIPINE BESYLATE 10 MG/1
10 TABLET ORAL DAILY
Qty: 90 TABLET | Refills: 2 | Status: SHIPPED | OUTPATIENT
Start: 2022-11-11 | End: 2023-08-11 | Stop reason: SDUPTHER

## 2022-11-17 ENCOUNTER — PATIENT MESSAGE (OUTPATIENT)
Dept: ENDOCRINOLOGY | Facility: HOSPITAL | Age: 62
End: 2022-11-17

## 2023-07-12 ENCOUNTER — TELEPHONE (OUTPATIENT)
Dept: INTERNAL MEDICINE | Facility: CLINIC | Age: 63
End: 2023-07-12
Payer: COMMERCIAL

## 2023-07-12 DIAGNOSIS — Z12.5 PROSTATE CANCER SCREENING: ICD-10-CM

## 2023-07-12 DIAGNOSIS — D64.9 NORMOCYTIC ANEMIA: Primary | ICD-10-CM

## 2023-07-12 DIAGNOSIS — R73.03 PREDIABETES: ICD-10-CM

## 2023-07-12 DIAGNOSIS — Z00.00 ANNUAL PHYSICAL EXAM: ICD-10-CM

## 2023-07-12 NOTE — TELEPHONE ENCOUNTER
"----- Message from Khushbu Pathak sent at 7/12/2023 10:18 AM CDT -----  Contact: P [phone]  type: Lab    Caller is requesting to schedule their Lab appointment prior to annual appointment.  Order is not listed in EPIC.  Please enter order and contact patient to schedule.    Name of Caller:Jesus Sullivan Date and Time of Labs: Annual exam     Date of Annual Physical Appointment: 07/28/2023    Where would they like the lab performed?[place] location .     Would the patient rather a call back or a response via [facility]? Phone Call     Best Call Back Number:[phone]    Additional Information:     "    "

## 2023-07-24 ENCOUNTER — LAB VISIT (OUTPATIENT)
Dept: LAB | Facility: HOSPITAL | Age: 63
End: 2023-07-24
Payer: COMMERCIAL

## 2023-07-24 DIAGNOSIS — Z00.00 ANNUAL PHYSICAL EXAM: ICD-10-CM

## 2023-07-24 DIAGNOSIS — R73.03 PREDIABETES: ICD-10-CM

## 2023-07-24 DIAGNOSIS — Z12.5 PROSTATE CANCER SCREENING: ICD-10-CM

## 2023-07-24 DIAGNOSIS — D64.9 NORMOCYTIC ANEMIA: ICD-10-CM

## 2023-07-24 LAB
ALBUMIN SERPL BCP-MCNC: 4 G/DL (ref 3.5–5.2)
ALP SERPL-CCNC: 69 U/L (ref 55–135)
ALT SERPL W/O P-5'-P-CCNC: 46 U/L (ref 10–44)
ANION GAP SERPL CALC-SCNC: 8 MMOL/L (ref 8–16)
AST SERPL-CCNC: 58 U/L (ref 10–40)
BASOPHILS # BLD AUTO: 0.03 K/UL (ref 0–0.2)
BASOPHILS NFR BLD: 0.9 % (ref 0–1.9)
BILIRUB SERPL-MCNC: 0.7 MG/DL (ref 0.1–1)
BILIRUB UR QL STRIP: NEGATIVE
BUN SERPL-MCNC: 17 MG/DL (ref 6–20)
CALCIUM SERPL-MCNC: 9.2 MG/DL (ref 8.7–10.5)
CHLORIDE SERPL-SCNC: 105 MMOL/L (ref 95–110)
CHOLEST SERPL-MCNC: 108 MG/DL (ref 120–199)
CHOLEST/HDLC SERPL: 2.8 {RATIO} (ref 2–5)
CLARITY UR REFRACT.AUTO: CLEAR
CO2 SERPL-SCNC: 22 MMOL/L (ref 23–29)
COLOR UR AUTO: YELLOW
COMPLEXED PSA SERPL-MCNC: 1.6 NG/ML (ref 0–4)
CREAT SERPL-MCNC: 0.9 MG/DL (ref 0.5–1.4)
DIFFERENTIAL METHOD: ABNORMAL
EOSINOPHIL # BLD AUTO: 0.2 K/UL (ref 0–0.5)
EOSINOPHIL NFR BLD: 6.6 % (ref 0–8)
ERYTHROCYTE [DISTWIDTH] IN BLOOD BY AUTOMATED COUNT: 12.5 % (ref 11.5–14.5)
EST. GFR  (NO RACE VARIABLE): >60 ML/MIN/1.73 M^2
ESTIMATED AVG GLUCOSE: 117 MG/DL (ref 68–131)
GLUCOSE SERPL-MCNC: 109 MG/DL (ref 70–110)
GLUCOSE UR QL STRIP: NEGATIVE
HBA1C MFR BLD: 5.7 % (ref 4–5.6)
HCT VFR BLD AUTO: 41.6 % (ref 40–54)
HDLC SERPL-MCNC: 38 MG/DL (ref 40–75)
HDLC SERPL: 35.2 % (ref 20–50)
HGB BLD-MCNC: 13.9 G/DL (ref 14–18)
HGB UR QL STRIP: NEGATIVE
IMM GRANULOCYTES # BLD AUTO: 0.01 K/UL (ref 0–0.04)
IMM GRANULOCYTES NFR BLD AUTO: 0.3 % (ref 0–0.5)
IRON SERPL-MCNC: 194 UG/DL (ref 45–160)
KETONES UR QL STRIP: NEGATIVE
LDLC SERPL CALC-MCNC: 51.6 MG/DL (ref 63–159)
LEUKOCYTE ESTERASE UR QL STRIP: NEGATIVE
LYMPHOCYTES # BLD AUTO: 1 K/UL (ref 1–4.8)
LYMPHOCYTES NFR BLD: 29.9 % (ref 18–48)
MCH RBC QN AUTO: 32.3 PG (ref 27–31)
MCHC RBC AUTO-ENTMCNC: 33.4 G/DL (ref 32–36)
MCV RBC AUTO: 97 FL (ref 82–98)
MONOCYTES # BLD AUTO: 0.4 K/UL (ref 0.3–1)
MONOCYTES NFR BLD: 10.4 % (ref 4–15)
NEUTROPHILS # BLD AUTO: 1.7 K/UL (ref 1.8–7.7)
NEUTROPHILS NFR BLD: 51.9 % (ref 38–73)
NITRITE UR QL STRIP: NEGATIVE
NONHDLC SERPL-MCNC: 70 MG/DL
NRBC BLD-RTO: 0 /100 WBC
PH UR STRIP: 6 [PH] (ref 5–8)
PLATELET # BLD AUTO: 204 K/UL (ref 150–450)
PMV BLD AUTO: 11.4 FL (ref 9.2–12.9)
POTASSIUM SERPL-SCNC: 4.7 MMOL/L (ref 3.5–5.1)
PROT SERPL-MCNC: 6.7 G/DL (ref 6–8.4)
PROT UR QL STRIP: NEGATIVE
RBC # BLD AUTO: 4.3 M/UL (ref 4.6–6.2)
SATURATED IRON: 48 % (ref 20–50)
SODIUM SERPL-SCNC: 135 MMOL/L (ref 136–145)
SP GR UR STRIP: 1.01 (ref 1–1.03)
T4 FREE SERPL-MCNC: 0.79 NG/DL (ref 0.71–1.51)
TOTAL IRON BINDING CAPACITY: 404 UG/DL (ref 250–450)
TRANSFERRIN SERPL-MCNC: 273 MG/DL (ref 200–375)
TRIGL SERPL-MCNC: 92 MG/DL (ref 30–150)
TSH SERPL DL<=0.005 MIU/L-ACNC: 1.33 UIU/ML (ref 0.4–4)
URN SPEC COLLECT METH UR: NORMAL
WBC # BLD AUTO: 3.35 K/UL (ref 3.9–12.7)

## 2023-07-24 PROCEDURE — 84466 ASSAY OF TRANSFERRIN: CPT | Performed by: FAMILY MEDICINE

## 2023-07-24 PROCEDURE — 36415 COLL VENOUS BLD VENIPUNCTURE: CPT | Mod: PO | Performed by: FAMILY MEDICINE

## 2023-07-24 PROCEDURE — 80053 COMPREHEN METABOLIC PANEL: CPT | Performed by: FAMILY MEDICINE

## 2023-07-24 PROCEDURE — 84153 ASSAY OF PSA TOTAL: CPT | Performed by: FAMILY MEDICINE

## 2023-07-24 PROCEDURE — 84439 ASSAY OF FREE THYROXINE: CPT | Performed by: FAMILY MEDICINE

## 2023-07-24 PROCEDURE — 80061 LIPID PANEL: CPT | Performed by: FAMILY MEDICINE

## 2023-07-24 PROCEDURE — 81003 URINALYSIS AUTO W/O SCOPE: CPT | Performed by: FAMILY MEDICINE

## 2023-07-24 PROCEDURE — 85025 COMPLETE CBC W/AUTO DIFF WBC: CPT | Performed by: FAMILY MEDICINE

## 2023-07-24 PROCEDURE — 83036 HEMOGLOBIN GLYCOSYLATED A1C: CPT | Performed by: FAMILY MEDICINE

## 2023-07-24 PROCEDURE — 84443 ASSAY THYROID STIM HORMONE: CPT | Performed by: FAMILY MEDICINE

## 2023-08-11 ENCOUNTER — OFFICE VISIT (OUTPATIENT)
Dept: INTERNAL MEDICINE | Facility: CLINIC | Age: 63
End: 2023-08-11
Payer: COMMERCIAL

## 2023-08-11 VITALS
SYSTOLIC BLOOD PRESSURE: 112 MMHG | TEMPERATURE: 98 F | HEART RATE: 66 BPM | HEIGHT: 64 IN | WEIGHT: 155.19 LBS | DIASTOLIC BLOOD PRESSURE: 74 MMHG | OXYGEN SATURATION: 98 % | RESPIRATION RATE: 18 BRPM | BODY MASS INDEX: 26.49 KG/M2

## 2023-08-11 DIAGNOSIS — Z00.00 WELL ADULT EXAM: Primary | ICD-10-CM

## 2023-08-11 DIAGNOSIS — D64.9 NORMOCYTIC ANEMIA: ICD-10-CM

## 2023-08-11 DIAGNOSIS — R73.03 PREDIABETES: ICD-10-CM

## 2023-08-11 DIAGNOSIS — E78.00 PURE HYPERCHOLESTEROLEMIA: ICD-10-CM

## 2023-08-11 DIAGNOSIS — I10 ESSENTIAL HYPERTENSION: ICD-10-CM

## 2023-08-11 PROCEDURE — 99396 PREV VISIT EST AGE 40-64: CPT | Mod: S$GLB,,, | Performed by: FAMILY MEDICINE

## 2023-08-11 PROCEDURE — 1160F PR REVIEW ALL MEDS BY PRESCRIBER/CLIN PHARMACIST DOCUMENTED: ICD-10-PCS | Mod: CPTII,S$GLB,, | Performed by: FAMILY MEDICINE

## 2023-08-11 PROCEDURE — 99396 PR PREVENTIVE VISIT,EST,40-64: ICD-10-PCS | Mod: S$GLB,,, | Performed by: FAMILY MEDICINE

## 2023-08-11 PROCEDURE — 3074F PR MOST RECENT SYSTOLIC BLOOD PRESSURE < 130 MM HG: ICD-10-PCS | Mod: CPTII,S$GLB,, | Performed by: FAMILY MEDICINE

## 2023-08-11 PROCEDURE — 3008F BODY MASS INDEX DOCD: CPT | Mod: CPTII,S$GLB,, | Performed by: FAMILY MEDICINE

## 2023-08-11 PROCEDURE — 3044F PR MOST RECENT HEMOGLOBIN A1C LEVEL <7.0%: ICD-10-PCS | Mod: CPTII,S$GLB,, | Performed by: FAMILY MEDICINE

## 2023-08-11 PROCEDURE — 99999 PR PBB SHADOW E&M-EST. PATIENT-LVL IV: ICD-10-PCS | Mod: PBBFAC,,, | Performed by: FAMILY MEDICINE

## 2023-08-11 PROCEDURE — 1160F RVW MEDS BY RX/DR IN RCRD: CPT | Mod: CPTII,S$GLB,, | Performed by: FAMILY MEDICINE

## 2023-08-11 PROCEDURE — 1159F PR MEDICATION LIST DOCUMENTED IN MEDICAL RECORD: ICD-10-PCS | Mod: CPTII,S$GLB,, | Performed by: FAMILY MEDICINE

## 2023-08-11 PROCEDURE — 3044F HG A1C LEVEL LT 7.0%: CPT | Mod: CPTII,S$GLB,, | Performed by: FAMILY MEDICINE

## 2023-08-11 PROCEDURE — 3074F SYST BP LT 130 MM HG: CPT | Mod: CPTII,S$GLB,, | Performed by: FAMILY MEDICINE

## 2023-08-11 PROCEDURE — 1159F MED LIST DOCD IN RCRD: CPT | Mod: CPTII,S$GLB,, | Performed by: FAMILY MEDICINE

## 2023-08-11 PROCEDURE — 3078F DIAST BP <80 MM HG: CPT | Mod: CPTII,S$GLB,, | Performed by: FAMILY MEDICINE

## 2023-08-11 PROCEDURE — 3078F PR MOST RECENT DIASTOLIC BLOOD PRESSURE < 80 MM HG: ICD-10-PCS | Mod: CPTII,S$GLB,, | Performed by: FAMILY MEDICINE

## 2023-08-11 PROCEDURE — 3008F PR BODY MASS INDEX (BMI) DOCUMENTED: ICD-10-PCS | Mod: CPTII,S$GLB,, | Performed by: FAMILY MEDICINE

## 2023-08-11 PROCEDURE — 99999 PR PBB SHADOW E&M-EST. PATIENT-LVL IV: CPT | Mod: PBBFAC,,, | Performed by: FAMILY MEDICINE

## 2023-08-11 RX ORDER — AMLODIPINE BESYLATE 10 MG/1
10 TABLET ORAL DAILY
Qty: 90 TABLET | Refills: 3 | Status: SHIPPED | OUTPATIENT
Start: 2023-08-11

## 2023-08-11 RX ORDER — ATORVASTATIN CALCIUM 10 MG/1
10 TABLET, FILM COATED ORAL DAILY
Qty: 90 TABLET | Refills: 3 | Status: SHIPPED | OUTPATIENT
Start: 2023-08-11 | End: 2024-08-10

## 2023-08-11 NOTE — PROGRESS NOTES
Subjective     Patient ID: Kar Lee is a 62 y.o. male.    Chief Complaint: Annual Exam    HPI 62-year-old white male former patient of Dr. Esposito presents to clinic today for annual physical exam.  Hypertension remains well controlled on amlodipine 10 mg daily.  Hypercholesterolemia is well controlled on Lipitor 10 mg daily.  Anemia has improved with increased dietary intake of iron.  He has a past surgical history of left hip surgery and tonsillectomy.  He reports a family history of his mother having hypertension and diabetes and his father having hypertension and prostate cancer.  He is up-to-date with all vaccinations and screening exams.  Review of Systems   Constitutional:  Negative for appetite change, chills, fatigue and fever.   HENT:  Negative for nasal congestion, ear pain, hearing loss, postnasal drip, rhinorrhea, sinus pressure/congestion, sore throat and tinnitus.    Eyes:  Negative for redness, itching and visual disturbance.   Respiratory:  Negative for cough, chest tightness and shortness of breath.    Cardiovascular:  Negative for chest pain and palpitations.   Gastrointestinal:  Negative for abdominal pain, constipation, diarrhea, nausea and vomiting.   Genitourinary:  Negative for decreased urine volume, difficulty urinating, dysuria, frequency, hematuria and urgency.   Musculoskeletal:  Negative for back pain, myalgias, neck pain and neck stiffness.   Integumentary:  Negative for rash.   Neurological:  Negative for dizziness, light-headedness and headaches.   Psychiatric/Behavioral: Negative.            Objective     Physical Exam  Vitals and nursing note reviewed.   Constitutional:       General: He is not in acute distress.     Appearance: Normal appearance. He is well-developed. He is not diaphoretic.   HENT:      Head: Normocephalic and atraumatic.      Right Ear: External ear normal.      Left Ear: External ear normal.      Nose: Nose normal.      Mouth/Throat:      Pharynx: No  oropharyngeal exudate.   Eyes:      General: No scleral icterus.        Right eye: No discharge.         Left eye: No discharge.      Conjunctiva/sclera: Conjunctivae normal.      Pupils: Pupils are equal, round, and reactive to light.   Neck:      Thyroid: No thyromegaly.      Vascular: No JVD.      Trachea: No tracheal deviation.   Cardiovascular:      Rate and Rhythm: Normal rate and regular rhythm.      Heart sounds: Normal heart sounds. No murmur heard.     No friction rub. No gallop.   Pulmonary:      Effort: Pulmonary effort is normal. No respiratory distress.      Breath sounds: Normal breath sounds. No stridor. No wheezing, rhonchi or rales.   Chest:      Chest wall: No tenderness.   Abdominal:      General: Bowel sounds are normal. There is no distension.      Palpations: Abdomen is soft. There is no mass.      Tenderness: There is no abdominal tenderness. There is no guarding or rebound.   Musculoskeletal:         General: No tenderness. Normal range of motion.      Cervical back: Normal range of motion and neck supple.   Lymphadenopathy:      Cervical: No cervical adenopathy.   Skin:     General: Skin is warm and dry.      Coloration: Skin is not pale.      Findings: No erythema or rash.   Neurological:      Mental Status: He is alert and oriented to person, place, and time.   Psychiatric:         Mood and Affect: Mood normal.         Behavior: Behavior normal.         Thought Content: Thought content normal.         Judgment: Judgment normal.            Assessment and Plan     1. Well adult exam    2. Essential hypertension  Overview:  Controlled on amlodipine 5mg daily    Orders:  -     amLODIPine (NORVASC) 10 MG tablet; Take 1 tablet (10 mg total) by mouth once daily. TAKE 1 TABLET(5 MG) BY MOUTH EVERY DAY  Dispense: 90 tablet; Refill: 3    3. Pure hypercholesterolemia  -     atorvastatin (LIPITOR) 10 MG tablet; Take 1 tablet (10 mg total) by mouth once daily.  Dispense: 90 tablet; Refill: 3    4.  Prediabetes    5. Normocytic anemia        1. Labs have been reviewed and are all stable.    2. Continue amlodipine 10 mg daily.  Hypertension is well controlled.  3. Continue Lipitor 10 mg daily.  Hypercholesterolemia is well controlled.    4. Continue diet and exercise.  Prediabetes remains stable.  Current A1c is 5.7.    5. Continue increased intake of dietary iron or start over-the-counter ferrous sulfate 325 mg daily.  Normocytic anemia is improving.    6. Return to clinic as needed or in 1 year for annual physical exam.           Follow up in about 1 year (around 8/11/2024), or if symptoms worsen or fail to improve, for Annual exam.

## 2023-10-02 ENCOUNTER — OFFICE VISIT (OUTPATIENT)
Dept: SPORTS MEDICINE | Facility: CLINIC | Age: 63
End: 2023-10-02
Payer: COMMERCIAL

## 2023-10-02 ENCOUNTER — HOSPITAL ENCOUNTER (OUTPATIENT)
Dept: RADIOLOGY | Facility: HOSPITAL | Age: 63
Discharge: HOME | End: 2023-10-02
Attending: ORTHOPAEDIC SURGERY
Payer: COMMERCIAL

## 2023-10-02 VITALS
DIASTOLIC BLOOD PRESSURE: 69 MMHG | HEIGHT: 64 IN | WEIGHT: 153 LBS | HEART RATE: 62 BPM | BODY MASS INDEX: 26.12 KG/M2 | SYSTOLIC BLOOD PRESSURE: 120 MMHG

## 2023-10-02 DIAGNOSIS — M25.552 LEFT HIP PAIN: Primary | ICD-10-CM

## 2023-10-02 DIAGNOSIS — M25.552 LEFT HIP PAIN: ICD-10-CM

## 2023-10-02 PROCEDURE — 73503 X-RAY EXAM HIP UNI 4/> VIEWS: CPT | Mod: 26,LT,, | Performed by: RADIOLOGY

## 2023-10-02 PROCEDURE — 1159F PR MEDICATION LIST DOCUMENTED IN MEDICAL RECORD: ICD-10-PCS | Mod: CPTII,S$GLB,, | Performed by: ORTHOPAEDIC SURGERY

## 2023-10-02 PROCEDURE — 99999 PR PBB SHADOW E&M-EST. PATIENT-LVL III: CPT | Mod: PBBFAC,,, | Performed by: ORTHOPAEDIC SURGERY

## 2023-10-02 PROCEDURE — 3078F PR MOST RECENT DIASTOLIC BLOOD PRESSURE < 80 MM HG: ICD-10-PCS | Mod: CPTII,S$GLB,, | Performed by: ORTHOPAEDIC SURGERY

## 2023-10-02 PROCEDURE — 3008F PR BODY MASS INDEX (BMI) DOCUMENTED: ICD-10-PCS | Mod: CPTII,S$GLB,, | Performed by: ORTHOPAEDIC SURGERY

## 2023-10-02 PROCEDURE — 73503 X-RAY EXAM HIP UNI 4/> VIEWS: CPT | Mod: TC,LT

## 2023-10-02 PROCEDURE — 73503 XR HIP WITH PELVIS WHEN PERFORMED, 4 OR MORE VIEWS LEFT: ICD-10-PCS | Mod: 26,LT,, | Performed by: RADIOLOGY

## 2023-10-02 PROCEDURE — 3078F DIAST BP <80 MM HG: CPT | Mod: CPTII,S$GLB,, | Performed by: ORTHOPAEDIC SURGERY

## 2023-10-02 PROCEDURE — 99214 PR OFFICE/OUTPT VISIT, EST, LEVL IV, 30-39 MIN: ICD-10-PCS | Mod: S$GLB,,, | Performed by: ORTHOPAEDIC SURGERY

## 2023-10-02 PROCEDURE — 3008F BODY MASS INDEX DOCD: CPT | Mod: CPTII,S$GLB,, | Performed by: ORTHOPAEDIC SURGERY

## 2023-10-02 PROCEDURE — 99999 PR PBB SHADOW E&M-EST. PATIENT-LVL III: ICD-10-PCS | Mod: PBBFAC,,, | Performed by: ORTHOPAEDIC SURGERY

## 2023-10-02 PROCEDURE — 3074F PR MOST RECENT SYSTOLIC BLOOD PRESSURE < 130 MM HG: ICD-10-PCS | Mod: CPTII,S$GLB,, | Performed by: ORTHOPAEDIC SURGERY

## 2023-10-02 PROCEDURE — 3044F HG A1C LEVEL LT 7.0%: CPT | Mod: CPTII,S$GLB,, | Performed by: ORTHOPAEDIC SURGERY

## 2023-10-02 PROCEDURE — 1159F MED LIST DOCD IN RCRD: CPT | Mod: CPTII,S$GLB,, | Performed by: ORTHOPAEDIC SURGERY

## 2023-10-02 PROCEDURE — 99214 OFFICE O/P EST MOD 30 MIN: CPT | Mod: S$GLB,,, | Performed by: ORTHOPAEDIC SURGERY

## 2023-10-02 PROCEDURE — 3074F SYST BP LT 130 MM HG: CPT | Mod: CPTII,S$GLB,, | Performed by: ORTHOPAEDIC SURGERY

## 2023-10-02 PROCEDURE — 3044F PR MOST RECENT HEMOGLOBIN A1C LEVEL <7.0%: ICD-10-PCS | Mod: CPTII,S$GLB,, | Performed by: ORTHOPAEDIC SURGERY

## 2023-10-02 NOTE — PROGRESS NOTES
CC: S/p L hip scope 1/19/16     HISTORY OF PRESENT ILLNESS:   Pt is here today for followup of his hip arthroscopy.  he is doing well.  We have reviewed his findings and discussed plan of care and future treatment options. He runs 10-12 miles per week.     Patient notes that his hip has been doing well, he has stiffness and pain most mornings  He continues to do his HEP and stretching in the mornings as well      Feels approx 95% better now than prior to surgery.  SANE 95  0/10    SANE preop 10  Preop pain 8/10      Biking now                 DATE OF PROCEDURE: 1/19/2016  PROCEDURE:    Left:  1. Hip arthroscopic labral debridement  2. Hip arthroscopic femoral neck osteoplasty  3. Hip arthroscopic partial synovectomy/debridement  4. Hip arthroscopic chondroplasty  5. 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                             Review of Systems   Constitution: Negative. Negative for chills, fever and night sweats.   HENT: Negative for congestion and headaches.    Eyes: Negative for blurred vision, left vision loss and right vision loss.   Cardiovascular: Negative for chest pain and syncope.   Respiratory: Negative for cough and shortness of breath.    Endocrine: Negative for polydipsia, polyphagia and polyuria.   Hematologic/Lymphatic: Negative for bleeding problem. Does not bruise/bleed easily.   Skin: Negative for dry skin, itching and rash.   Musculoskeletal: Negative for falls and muscle weakness.   Gastrointestinal: Negative for abdominal pain and bowel incontinence.   Genitourinary: Negative for bladder incontinence and nocturia.   Neurological: Negative for disturbances in coordination, loss of balance and seizures.   Psychiatric/Behavioral:  "Negative for depression. The patient does not have insomnia.    Allergic/Immunologic: Negative for hives and persistent infections.      PAST MEDICAL HISTORY:        Past Medical History:   Diagnosis Date    Hypertension        PAST SURGICAL HISTORY:         Past Surgical History:   Procedure Laterality Date    COLONOSCOPY N/A 11/20/2017     Procedure: COLONOSCOPY;  Surgeon: Abdi Barakat MD;  Location: [address];  Service: Endoscopy;  Laterality: N/A;    HIP SURGERY Left 1/19/2016     Dr Huston     TONSILLECTOMY          FAMILY HISTORY:         Family History   Problem Relation Age of Onset    Retinal detachment Mother      Cataracts Mother      Diabetes Mother      Hypertension Mother      Prostate cancer Father      Hypertension Father      No Known Problems Sister      No Known Problems Sister      No Known Problems Sister      No Known Problems Daughter      No Known Problems Son      No Known Problems Daughter      Glaucoma Neg Hx      Macular degeneration Neg Hx      Strabismus Neg Hx      Amblyopia Neg Hx      Blindness Neg Hx        SOCIAL HISTORY:   Social History            Social History    Marital status:        Spouse name: N/A    Number of children: N/A    Years of education: N/A          Occupational History    Not on file.             Social History Main Topics    Smoking status: Never Smoker    Smokeless tobacco: Never Used    Alcohol use 1.2 oz/week       1 Cans of beer, 1 Shots of liquor per week         Comment: occasional    Drug use: No    Sexual activity: Yes           Other Topics Concern    Not on file          Social History Narrative    No narrative on file         MEDICATIONS: No current outpatient prescriptions on file.  ALLERGIES: Review of patient's allergies indicates:  No Known Allergies     VITAL SIGNS:   BP (!) 176/87   Pulse (!) 53   Ht 5' 7" (1.702 m)   Wt 68.9 kg (152 lb)   BMI 23.81 kg/m²                PHYSICAL EXAMINATION:     Flexion ROM 0-110 " degrees   IR with load 20  IR without load 30  ER 25  abd 45  Add 15  - Fadir  5/5 strength  No effusion  2+ DP pulse  No swelling, no calf tenderness  + IT band tightness  + bridge                                                                               ASSESSMENT:                                                                                                                                               1. Status post above, doing well.                                                                                                                               PLAN:                                                                                                                                                     1. Continue HEP  2. Emphasized core function.  3. F/U in 12 months with x-rays and hip form

## 2023-10-16 ENCOUNTER — CLINICAL SUPPORT (OUTPATIENT)
Dept: REHABILITATION | Facility: HOSPITAL | Age: 63
End: 2023-10-16
Payer: COMMERCIAL

## 2023-10-16 DIAGNOSIS — M25.552 PAIN OF LEFT HIP: ICD-10-CM

## 2023-10-16 DIAGNOSIS — R29.898 WEAKNESS OF LEFT LOWER EXTREMITY: ICD-10-CM

## 2023-10-16 DIAGNOSIS — M25.552 LEFT HIP PAIN: ICD-10-CM

## 2023-10-16 PROCEDURE — 97110 THERAPEUTIC EXERCISES: CPT

## 2023-10-16 PROCEDURE — 97161 PT EVAL LOW COMPLEX 20 MIN: CPT

## 2023-10-16 NOTE — PLAN OF CARE
[facility]   Physical Therapy Initial Evaluation      Date: 10/16/2023   Name: Kar Lee  Clinic Number: [id number]    Therapy Diagnosis:   Encounter Diagnoses   Name Primary?    Left hip pain     Pain of left hip     Weakness of left lower extremity       Physician: Kristin Huston MD     Physician Orders: PT Eval and Treat  Medical Diagnosis from Referral: M25.552 (ICD-10-CM) - Left hip pain  Evaluation Date: 10/16/2023  Authorization Period Expiration: 12/31/2023  Plan of Care Expiration: 12/15/2023  Visit # / Visits authorized: 1/1   FOTO: 1/3 (last performed on 10/16/2023)    Precautions: Standard    Time In: 3:00 pm  Time Out: 4:00 pm  Total Billable Time (timed & untimed codes): 60 minutes    Subjective     Date of onset: about 2 years     History of current condition - Kar reports he had hip surgery/scope over 5 years ago but started having pain on the outside of his hip about 2 years ago. He describes the pain as tightness and mainly feels it when he sleeps on his side and towards the end of long bike rides (60 miles). He used to run but strictly bikes 3 times a week (30-60 miles per session) and strength trains upper body. He denies any mechanical symptoms or history of lower back issues. His goal is to decrease his hip/leg pain and improve his mobility.     Falls: n/a    Imaging: [x] Xray [] MRI [] CT: Performed on: 10/2/2023  FINDINGS:  Is 18 bones of the pelvis and hips are intact with no significant bony abnormalities.  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 Nagging hip pain when sleeping and during/after prolonged bike rides.     Pts goals: Pt reported goals are to decrease hip pain levels and improve mobility/strength of left hip.     Medical History:   Past Medical History:   Diagnosis Date    Hypertension        Surgical History:   Kar Lee  has a past surgical history that includes Tonsillectomy; Hip surgery (Left, 1/19/2016); and Colonoscopy (N/A, 11/20/2017).    Medications:   Kar has a current medication list which includes the following prescription(s): amlodipine, aspirin, and atorvastatin.    Allergies:   Review of patient's allergies indicates:  No Known Allergies     Objective      Gait: unremarkable    Functional Testing:  Hinge: unremarkable   Rotation: unremarkable   Squat: slight weight shift to right side, L hip pain   Anterior Step Down: loss of balance on L   Lateral Step Down (off 12" stool): contralateral pelvic drop on L, dynamic valgus on L   Forward Lunge: functional, pain on L   Lateral Lunge: functional, pain on L   SL Balance:    - Right: 30 seconds    - Left: 18.6 seconds     Hip Active Range of Motion:   Right  Left    Flexion 115 115   Abduction WNL WNL   Extension 10 10   Ext. Rotation 20 20   Int. Rotation 30 30       Lower Extremity Strength:   Right  Left    Iliopsoas (sitting): 4-/5 4-/5   Hip extension:  4/5 4/5   Hip Abduction: 4/5 4-/5 *   Hip Internal Rotation: 4/5 4-/5   Hip External Rotation:  4/5 4-/5   *denotes pain     Special Tests:   Right Left   BENI (-) (+)   FADDIR  (-) (-)   Max Squat Test (-) (-)   Clarence's Test (+) (+)   Noble Compression Test (-) (-)        Joint Mobility: not formally assessed     Palpation: Increased tone and tenderness to palpation of: left greater trochanter and gluteus medius tendon    Sensation: [x] Intact " "to Light Touch   [] Impaired:    Function:     Intake Outcome Measure for FOTO Hip Survey    Therapist reviewed FOTO scores for Kar on 10/16/2023.   FOTO report - see Media section or FOTO account for episode details    Intake Score: 65%         Treatment     Total Treatment time (time-based codes) separate from Evaluation:     Kar received the treatments listed below:      THERAPEUTIC EXERCISES to develop strength, endurance, ROM, flexibility, posture, and core stabilization for (10) minutes including:    Side Plank 3x20" bilateral   Lateral Band Walks 2x10 each way   Wall Clams 2x10 B       Patient Education and Home Exercises     Education provided:   PURPOSE: Patient educated on the impairments noted above and the effects of physical therapy intervention to improve overall condition and QOL.   Prognosis, activity modification, goals for therapy, role of therapy for care, exercises/HEP    Written Home Exercises Provided: yes.  Exercises were reviewed and Kar was able to demonstrate them prior to the end of the session.  Kar demonstrated good  understanding of the education provided. See EMR under Patient Instructions for exercises provided during therapy sessions.    Assessment     Kar is a 62 y.o. male referred to outpatient Physical Therapy with a medical diagnosis of left hip pain. Pt presents with decreased strength of hip abductors and deep rotators, decreased muscle length, and decreased pain with functional movement patterns. Signs and symptoms suggest possible tendinopathy of gluteus medius. Pt will benefit from physical therapy to address impairments listed above.     Pt prognosis is Excellent  Pt will benefit from skilled outpatient Physical Therapy to address the deficits stated above and in the chart below, provide pt/family education, and to maximize pt's level of independence.     Plan of care discussed with patient: Yes  Pt's spiritual, cultural and educational needs considered and patient is " "agreeable to the plan of care and goals as stated below:     Anticipated Barriers for therapy: none    Medical Necessity is demonstrated by the following  History  Co-morbidities and personal factors that may impact the plan of care [x] LOW: no personal factors / co-morbidities  [] MODERATE: 1-2 personal factors / co-morbidities  [] HIGH: 3+ personal factors / co-morbidities    Moderate / High Support Documentation:   Past Medical History:   Diagnosis Date    Hypertension         Examination  Body Structures and Functions, activity limitations and participation restrictions that may impact the plan of care [x] LOW: addressing 1-2 elements  [] MODERATE: 3+ elements  [] HIGH: 4+ elements (please support below)    Moderate / High Support Documentation: See above in "Current Level of Function"      Clinical Presentation [x] LOW: stable  [] MODERATE: Evolving  [] HIGH: Unstable     Decision Making/ Complexity Score: low         Short Term Goals:  4 weeks Status  Date Met   Pt will report worst pain of </= 4/10 in order to progress toward max functional ability and improve quality of life. [x] Progressing  [] Met  [] Not Met    Patient will demonstrate improved function as indicated by a score of greater than or equal to 70 out of 100 on FOTO. [x] Progressing  [] Met  [] Not Met    Pt will be compliant with HEP 50% of prescribed amount.  [x] Progressing  [] Met  [] Not Met    The pt to demo improvement in single leg balance on L side equal to time performed on R side.  [x] Progressing  [] Met  [] Not Met      Long Term Goals:  8 weeks Status Date Met   Pt will report worst pain of </= 2/10 in order to progress toward max functional ability and improve quality of life [x] Progressing  [] Met  [] Not Met    Patient will improve their FOTO limitation score to at least 75 as evidence of clinically significant improvements in their function. [x] Progressing  [] Met  [] Not Met    Pt to demo at least 4+/5 on strength testing of " hip musculature to demo improvement in tolerance to activity.  [x] Progressing  [] Met  [] Not Met    Pt to demo functional movement patterns without pain relating to L hip.   [x] Progressing  [] Met  [] Not Met    Pt will be compliant with % of prescribed amount.  [x] Progressing  [] Met  [] Not Met    Pt goal: pt to report no hip pain at end of 60 mile bike ride. [x] Progressing  [] Met  [] Not Met          Plan     Plan of care Certification: 10/16/2023 to 12/15/2023.    Outpatient Physical Therapy 1-2 times weekly for 8 weeks to include any combination of the following interventions: virtual visits, dry needling, modalities, electrical stimulation (IFC, Pre-Mod, Attended with Functional Dry Needling), Manual Therapy, Neuromuscular Re-ed, Patient Education, Self Care, Therapeutic Exercise, and Therapeutic Activites     Winifred Hsu, PT, DPT    Ace Izquierdo, PT, DPT, OCS, SCS

## 2023-10-23 ENCOUNTER — CLINICAL SUPPORT (OUTPATIENT)
Dept: REHABILITATION | Facility: HOSPITAL | Age: 63
End: 2023-10-23
Payer: COMMERCIAL

## 2023-10-23 DIAGNOSIS — M25.552 LEFT HIP PAIN: Primary | ICD-10-CM

## 2023-10-23 PROCEDURE — 97112 NEUROMUSCULAR REEDUCATION: CPT

## 2023-10-23 PROCEDURE — 97530 THERAPEUTIC ACTIVITIES: CPT

## 2023-10-23 PROCEDURE — 97110 THERAPEUTIC EXERCISES: CPT

## 2023-10-23 NOTE — PROGRESS NOTES
"[facility]   Physical Therapy Treatment Note      Name: Kar Lee  Clinic Number: [id number]    Therapy Diagnosis:   Encounter Diagnosis   Name Primary?    Left hip pain Yes     Physician: Kristin Huston MD    Visit Date: 10/23/2023    Physician Orders: PT Eval and Treat  Medical Diagnosis from Referral: M25.552 (ICD-10-CM) - Left hip pain  Evaluation Date: 10/16/2023  Authorization Period Expiration: 12/31/2023  Plan of Care Expiration: 12/15/2023  Visit # / Visits authorized: 1/20   FOTO: 1/3 (last performed on 10/16/2023)    Time In: 600  Time Out: 655  Total Billable Time: 55 minutes    Subjective     Pt reports: he tolerated eval well. Pt reports his weekend bike ride went well but noticed some hip pain when climbing hills otherwise tolerated well.  He was compliant with home exercise program.  Response to previous treatment:/Functional change: no pain after eval. Some hip pain with hip accent    Pain: 1/10  Location: left hip      Objective      Hip Active Range of Motion:    Right  Left    Flexion 115 115   Abduction WNL WNL   Extension 10 10   Ext. Rotation 20 20   Int. Rotation 30 30        Treatment     Kar received the treatments listed below:      therapeutic exercises to develop strength, endurance, ROM, flexibility, posture, and core stabilization for 8 minutes including:    Upright bike for tissue extensibility/ pain modulation 7 min  Pt education on gym routine for HEP    manual therapy techniques: Joint mobilizations were applied to the: L hip for 2 minutes, including:    PROM check  Hip IR stretch    neuromuscular re-education activities to improve: Balance, Coordination, Kinesthetic, Sense, Proprioception, and Posture for 17 minutes. The following activities were included:    SL bridge 3x10  Bird dog 3x10  Lateral Band Walks 2x10 each way   Anti rot press 3x15 BTB    NP today  Side Plank 3x20" bilateral   Wall Clams 2x10 B     therapeutic activities to improve " "functional performance for 28 minutes, includin# KB unilat farmers walk in R UE 3 laps 40 ft  Wall sit 3x30"  12" step up 4x8  6" rotation step up 3x12      Patient Education and Home Exercises       Education provided:   - gym routine for HEP    Written Home Exercises Provided: yes. Exercises were reviewed and Kar was able to demonstrate them prior to the end of the session.  Kar demonstrated good  understanding of the education provided. See EMR under Patient Instructions for exercises provided during therapy sessions    Assessment     Pt presents with pain free hip PROM on R w/ over pressure. Progressed core/ glute strengthening with good tolerance. Pt challenged with glute strengthening in greater hip flexion angles. Will continue to progress glute strengthening as tolerated. Pt instructed on gym regiment    Kar Is progressing well towards his goals.   Pt prognosis is Good.     Pt will continue to benefit from skilled outpatient physical therapy to address the deficits listed in the problem list box on initial evaluation, provide pt/family education and to maximize pt's level of independence in the home and community environment.     Pt's spiritual, cultural and educational needs considered and pt agreeable to plan of care and goals.     Anticipated barriers to physical therapy: work schedule    Goals:     Short Term Goals:  4 weeks Status  Date Met   Pt will report worst pain of </= 4/10 in order to progress toward max functional ability and improve quality of life. [x] Progressing  [] Met  [] Not Met     Patient will demonstrate improved function as indicated by a score of greater than or equal to 70 out of 100 on FOTO. [x] Progressing  [] Met  [] Not Met     Pt will be compliant with HEP 50% of prescribed amount.  [x] Progressing  [] Met  [] Not Met     The pt to demo improvement in single leg balance on L side equal to time performed on R side.  [x] Progressing  [] Met  [] Not Met        Long Term " Goals:  8 weeks Status Date Met   Pt will report worst pain of </= 2/10 in order to progress toward max functional ability and improve quality of life [x] Progressing  [] Met  [] Not Met     Patient will improve their FOTO limitation score to at least 75 as evidence of clinically significant improvements in their function. [x] Progressing  [] Met  [] Not Met     Pt to demo at least 4+/5 on strength testing of hip musculature to demo improvement in tolerance to activity.  [x] Progressing  [] Met  [] Not Met     Pt to demo functional movement patterns without pain relating to L hip.   [x] Progressing  [] Met  [] Not Met     Pt will be compliant with % of prescribed amount.  [x] Progressing  [] Met  [] Not Met     Pt goal: pt to report no hip pain at end of 60 mile bike ride. [x] Progressing  [] Met  [] Not Met          Plan     Continue progressing glute/ core strength as tolerated.    Ace Izquierdo, PT

## 2023-11-01 ENCOUNTER — CLINICAL SUPPORT (OUTPATIENT)
Dept: REHABILITATION | Facility: HOSPITAL | Age: 63
End: 2023-11-01
Payer: COMMERCIAL

## 2023-11-01 DIAGNOSIS — R29.898 WEAKNESS OF LEFT LOWER EXTREMITY: ICD-10-CM

## 2023-11-01 DIAGNOSIS — M25.552 PAIN OF LEFT HIP: Primary | ICD-10-CM

## 2023-11-01 PROCEDURE — 97530 THERAPEUTIC ACTIVITIES: CPT

## 2023-11-01 PROCEDURE — 97110 THERAPEUTIC EXERCISES: CPT

## 2023-11-01 PROCEDURE — 97112 NEUROMUSCULAR REEDUCATION: CPT

## 2023-11-01 NOTE — PROGRESS NOTES
"[facility]   Physical Therapy Treatment Note      Name: Kar Lee  Clinic Number: [id number]    Therapy Diagnosis:   Encounter Diagnoses   Name Primary?    Pain of left hip Yes    Weakness of left lower extremity        Physician: Kristin Huston MD    Visit Date: 11/1/2023    Physician Orders: PT Eval and Treat  Medical Diagnosis from Referral: M25.552 (ICD-10-CM) - Left hip pain  Evaluation Date: 10/16/2023  Authorization Period Expiration: 12/31/2023  Plan of Care Expiration: 12/15/2023  Visit # / Visits authorized: 2/20   FOTO: 1/3 (last performed on 10/16/2023)    Time In: 4:00 pm   Time Out: 4:50 pm   Total Billable Time: 50 minutes    Subjective     Pt reports: dong well, no reports of hip pain while riding this weekend but stayed in the saddle most of the time.     He was compliant with home exercise program.    Response to previous treatment:/Functional change: decreased pain while bike riding     Pain: 1/10  Location: left hip      Objective      Hip Active Range of Motion:    Right  Left    Flexion 115 115   Abduction WNL WNL   Extension 10 10   Ext. Rotation 20 20   Int. Rotation 30 30        Treatment     Kar received the treatments listed below:      therapeutic exercises to develop strength, endurance, ROM, flexibility, posture, and core stabilization for 8 minutes including:    Upright bike for tissue extensibility/ pain modulation 7 min  Pt education on gym routine for HEP    manual therapy techniques: Joint mobilizations were applied to the: L hip for 0 minutes, including:    PROM check  Hip IR stretch    neuromuscular re-education activities to improve: Balance, Coordination, Kinesthetic, Sense, Proprioception, and Posture for 18 minutes. The following activities were included:  SL bridge 3x10  Lateral Band Walks 4x10 each way GTB   Anti rot press 3x10 w/ 17#   SL RDL 3x8 B w/ 25#     NP today  Side Plank 3x20" bilateral   Wall Clams 2x10 B   Bird dog 3x10    therapeutic " "activities to improve functional performance for 24 minutes, including:  Trap bar DL 4x12 w/ 115#  Farmer's Carry March w/ 25# Kbs 3 laps   12" step up 3x8 B w/ 25#     Not Performed today:   35# KB unilat farmers walk in R UE 3 laps 40 ft  Wall sit 3x30"  6" rotation step up 3x12      Patient Education and Home Exercises       Education provided:   - gym routine for HEP    Written Home Exercises Provided: yes. Exercises were reviewed and Kar was able to demonstrate them prior to the end of the session.  Kar demonstrated good  understanding of the education provided. See EMR under Patient Instructions for exercises provided during therapy sessions    Assessment     Pt presents to therapy reporting no hip pain. Progressed core and posterior chain strengthening with good tolerance. Pt challenged with single leg RDLs. Pt instructed on incorporating single leg exercises in the gym at least twice per week. Pt reported that his hip felt better than when he walked in. Will monitor response and progress as tolerated.     Kar Is progressing well towards his goals.   Pt prognosis is Good.     Pt will continue to benefit from skilled outpatient physical therapy to address the deficits listed in the problem list box on initial evaluation, provide pt/family education and to maximize pt's level of independence in the home and community environment.     Pt's spiritual, cultural and educational needs considered and pt agreeable to plan of care and goals.     Anticipated barriers to physical therapy: work schedule    Goals:     Short Term Goals:  4 weeks Status  Date Met   Pt will report worst pain of </= 4/10 in order to progress toward max functional ability and improve quality of life. [x] Progressing  [] Met  [] Not Met     Patient will demonstrate improved function as indicated by a score of greater than or equal to 70 out of 100 on FOTO. [x] Progressing  [] Met  [] Not Met     Pt will be compliant with HEP 50% of prescribed " amount.  [x] Progressing  [] Met  [] Not Met     The pt to demo improvement in single leg balance on L side equal to time performed on R side.  [x] Progressing  [] Met  [] Not Met        Long Term Goals:  8 weeks Status Date Met   Pt will report worst pain of </= 2/10 in order to progress toward max functional ability and improve quality of life [x] Progressing  [] Met  [] Not Met     Patient will improve their FOTO limitation score to at least 75 as evidence of clinically significant improvements in their function. [x] Progressing  [] Met  [] Not Met     Pt to demo at least 4+/5 on strength testing of hip musculature to demo improvement in tolerance to activity.  [x] Progressing  [] Met  [] Not Met     Pt to demo functional movement patterns without pain relating to L hip.   [x] Progressing  [] Met  [] Not Met     Pt will be compliant with % of prescribed amount.  [x] Progressing  [] Met  [] Not Met     Pt goal: pt to report no hip pain at end of 60 mile bike ride. [x] Progressing  [] Met  [] Not Met          Plan     Continue progressing glute/ core strength as tolerated.    Winifred Hsu, PT

## 2023-11-08 ENCOUNTER — CLINICAL SUPPORT (OUTPATIENT)
Dept: REHABILITATION | Facility: HOSPITAL | Age: 63
End: 2023-11-08
Payer: COMMERCIAL

## 2023-11-08 DIAGNOSIS — M25.552 PAIN OF LEFT HIP: Primary | ICD-10-CM

## 2023-11-08 DIAGNOSIS — R29.898 WEAKNESS OF LEFT LOWER EXTREMITY: ICD-10-CM

## 2023-11-08 PROCEDURE — 97110 THERAPEUTIC EXERCISES: CPT

## 2023-11-08 PROCEDURE — 97530 THERAPEUTIC ACTIVITIES: CPT

## 2023-11-08 PROCEDURE — 97112 NEUROMUSCULAR REEDUCATION: CPT

## 2023-11-08 NOTE — PROGRESS NOTES
[facility]   Physical Therapy Treatment Note      Name: Kar Lee  Clinic Number: [id number]    Therapy Diagnosis:   Encounter Diagnoses   Name Primary?    Pain of left hip Yes    Weakness of left lower extremity        Physician: Kristin Huston MD    Visit Date: 11/8/2023    Physician Orders: PT Eval and Treat  Medical Diagnosis from Referral: M25.552 (ICD-10-CM) - Left hip pain  Evaluation Date: 10/16/2023  Authorization Period Expiration: 12/31/2023  Plan of Care Expiration: 12/15/2023  Visit # / Visits authorized: 3/20   FOTO: 1/3 (last performed on 10/16/2023)    Time In: 5:30 pm   Time Out: 6:18 pm   Total Billable Time: 48 minutes    Subjective     Pt reports: hip is doing well. Did HEP twice since last visit. No hip pain when riding over the weekend (42 miles).     He was compliant with home exercise program.    Response to previous treatment:/Functional change: decreased pain while bike riding     Pain: 1/10  Location: left hip      Objective      Hip Active Range of Motion:    Right  Left    Flexion 115 115   Abduction WNL WNL   Extension 10 10   Ext. Rotation 20 20   Int. Rotation 30 30        Treatment     Kar received the treatments listed below:      therapeutic exercises to develop strength, endurance, ROM, flexibility, posture, and core stabilization for 10 minutes including:    Upright bike for tissue extensibility/ pain modulation 10 min, level 10   Pt education on gym routine for HEP    manual therapy techniques: Joint mobilizations were applied to the: L hip for 0 minutes, including:    PROM check  Hip IR stretch    neuromuscular re-education activities to improve: Balance, Coordination, Kinesthetic, Sense, Proprioception, and Posture for 24 minutes. The following activities were included:  SL bridge w/ knee hug 3x15   Lateral Band Walks 3x10 each way BTB   Anti rot press 3x10 w/ 20#   SL RDL 3x12 B w/ 25#   Lateral lunge sliders 3x6 B   Curtsey lunge 3x6 B     NP  "today  Side Plank 3x20" bilateral   Wall Clams 2x10 B   Bird dog 3x10    therapeutic activities to improve functional performance for 14 minutes, including:  Trap bar DL 4x12 w/ 135#  12" step up with knee drive 3x8 B w/ 25#   35# KB unilat farmers walk in R UE 3 laps 40 ft    Not Performed today:   Wall sit 3x30"  6" rotation step up 3x12  Eason's Carry March w/ 25# Kbs 3 laps       Patient Education and Home Exercises       Education provided:   - gym routine for HEP    Written Home Exercises Provided: yes. Exercises were reviewed and Kar was able to demonstrate them prior to the end of the session.  Kar demonstrated good  understanding of the education provided. See EMR under Patient Instructions for exercises provided during therapy sessions    Assessment     Pt continues to report no hip pain or discomfort with ADLs or long bike rides. Progressed single leg exercises by incorporating multi-directional lunges which was challenging for him. Pt instructed to continue HEP at least twice per week. Will monitor response and progress as tolerated.     Kar Is progressing well towards his goals.   Pt prognosis is Good.     Pt will continue to benefit from skilled outpatient physical therapy to address the deficits listed in the problem list box on initial evaluation, provide pt/family education and to maximize pt's level of independence in the home and community environment.     Pt's spiritual, cultural and educational needs considered and pt agreeable to plan of care and goals.     Anticipated barriers to physical therapy: work schedule    Goals:     Short Term Goals:  4 weeks Status  Date Met   Pt will report worst pain of </= 4/10 in order to progress toward max functional ability and improve quality of life. [x] Progressing  [] Met  [] Not Met     Patient will demonstrate improved function as indicated by a score of greater than or equal to 70 out of 100 on FOTO. [x] Progressing  [] Met  [] Not Met     Pt will " be compliant with HEP 50% of prescribed amount.  [x] Progressing  [] Met  [] Not Met     The pt to demo improvement in single leg balance on L side equal to time performed on R side.  [x] Progressing  [] Met  [] Not Met        Long Term Goals:  8 weeks Status Date Met   Pt will report worst pain of </= 2/10 in order to progress toward max functional ability and improve quality of life [x] Progressing  [] Met  [] Not Met     Patient will improve their FOTO limitation score to at least 75 as evidence of clinically significant improvements in their function. [x] Progressing  [] Met  [] Not Met     Pt to demo at least 4+/5 on strength testing of hip musculature to demo improvement in tolerance to activity.  [x] Progressing  [] Met  [] Not Met     Pt to demo functional movement patterns without pain relating to L hip.   [x] Progressing  [] Met  [] Not Met     Pt will be compliant with % of prescribed amount.  [x] Progressing  [] Met  [] Not Met     Pt goal: pt to report no hip pain at end of 60 mile bike ride. [x] Progressing  [] Met  [] Not Met          Plan     Continue progressing glute/ core strength as tolerated.    Winifred Hsu, PT

## 2023-11-15 ENCOUNTER — CLINICAL SUPPORT (OUTPATIENT)
Dept: REHABILITATION | Facility: HOSPITAL | Age: 63
End: 2023-11-15
Payer: COMMERCIAL

## 2023-11-15 DIAGNOSIS — M25.552 PAIN OF LEFT HIP: Primary | ICD-10-CM

## 2023-11-15 DIAGNOSIS — R29.898 WEAKNESS OF LEFT LOWER EXTREMITY: ICD-10-CM

## 2023-11-15 PROCEDURE — 97112 NEUROMUSCULAR REEDUCATION: CPT

## 2023-11-15 PROCEDURE — 97110 THERAPEUTIC EXERCISES: CPT

## 2023-11-15 PROCEDURE — 97530 THERAPEUTIC ACTIVITIES: CPT

## 2023-11-15 NOTE — PROGRESS NOTES
[facility]   Physical Therapy Treatment Note      Name: Kar Lee  Clinic Number: [id number]    Therapy Diagnosis:   Encounter Diagnoses   Name Primary?    Pain of left hip Yes    Weakness of left lower extremity        Physician: Kristin Huston MD    Visit Date: 11/15/2023    Physician Orders: PT Eval and Treat  Medical Diagnosis from Referral: M25.552 (ICD-10-CM) - Left hip pain  Evaluation Date: 10/16/2023  Authorization Period Expiration: 12/31/2023  Plan of Care Expiration: 12/15/2023  Visit # / Visits authorized: 4/20   FOTO: 1/3 (last performed on 10/16/2023)    Time In: 5:30 pm   Time Out: 6:25 pm   Total Billable Time: 55 minutes    Subjective     Pt reports: hip is doing well, has been going to the gym and doing exercises dicussed. No bike riding recently due to the weather.     He was compliant with home exercise program.    Response to previous treatment:/Functional change: decreased pain while bike riding     Pain: 1/10  Location: left hip      Objective      Hip Active Range of Motion:    Right  Left    Flexion 115 115   Abduction WNL WNL   Extension 10 10   Ext. Rotation 20 20   Int. Rotation 30 30        Treatment     Kar received the treatments listed below:      therapeutic exercises to develop strength, endurance, ROM, flexibility, posture, and core stabilization for 10 minutes including:    Upright bike for tissue extensibility/ pain modulation 10 min, level 10   Pt education on gym routine for HEP    manual therapy techniques: Joint mobilizations were applied to the: L hip for 0 minutes, including:    PROM check  Hip IR stretch    neuromuscular re-education activities to improve: Balance, Coordination, Kinesthetic, Sense, Proprioception, and Posture for 27 minutes. The following activities were included:  SL bridge w/ knee hug 3x15 B  Modified side plank with hip abduction 3x10 B  Wall Clams 3x10 GTB  SL Anti rot press 3x10 w/ 10#   SL RDL 3x12 B w/ 25#   Curtsey  "lunge 1x10 w/ BW, 2x8 w/ 20# B     NP today  Lateral lunge sliders 3x6 B   Side Plank 3x20" bilateral   Bird dog 3x10  Lateral Band Walks 3x10 each way BTB    therapeutic activities to improve functional performance for 18 minutes, including:  Trap bar DL 4x12 w/ 135#  Reverse Lunge to knee drive 3x10 B w/ 15# DBs  40# KB unilat farmers walk in R UE 3 laps 40 ft    Not Performed today:   Wall sit 3x30"  12" step up with knee drive 3x8 B w/ 25#   6" rotation step up 3x12  Eason's Carry March w/ 25# Kbs 3 laps       Patient Education and Home Exercises       Education provided:   - gym routine for HEP    Written Home Exercises Provided: yes. Exercises were reviewed and Kar was able to demonstrate them prior to the end of the session.  Kar demonstrated good  understanding of the education provided. See EMR under Patient Instructions for exercises provided during therapy sessions    Assessment     Pt continues to do well with hip strengthening progressions. He noted mild discomfort of lateral hip during side planks but subsided after discontinuing. Pt educated to continue HEP. Will monitor response and progress as tolerated.     Kar Is progressing well towards his goals.   Pt prognosis is Good.     Pt will continue to benefit from skilled outpatient physical therapy to address the deficits listed in the problem list box on initial evaluation, provide pt/family education and to maximize pt's level of independence in the home and community environment.     Pt's spiritual, cultural and educational needs considered and pt agreeable to plan of care and goals.     Anticipated barriers to physical therapy: work schedule    Goals:     Short Term Goals:  4 weeks Status  Date Met   Pt will report worst pain of </= 4/10 in order to progress toward max functional ability and improve quality of life. [] Progressing  [x] Met  [] Not Met  11/15/2023   Patient will demonstrate improved function as indicated by a score of greater " than or equal to 70 out of 100 on FOTO. [x] Progressing  [] Met  [] Not Met     Pt will be compliant with HEP 50% of prescribed amount.  [] Progressing  [x] Met  [] Not Met 11/15/2023    The pt to demo improvement in single leg balance on L side equal to time performed on R side.  [x] Progressing  [] Met  [] Not Met        Long Term Goals:  8 weeks Status Date Met   Pt will report worst pain of </= 2/10 in order to progress toward max functional ability and improve quality of life [x] Progressing  [] Met  [] Not Met     Patient will improve their FOTO limitation score to at least 75 as evidence of clinically significant improvements in their function. [x] Progressing  [] Met  [] Not Met     Pt to demo at least 4+/5 on strength testing of hip musculature to demo improvement in tolerance to activity.  [x] Progressing  [] Met  [] Not Met     Pt to demo functional movement patterns without pain relating to L hip.   [x] Progressing  [] Met  [] Not Met     Pt will be compliant with % of prescribed amount.  [] Progressing  [x] Met  [] Not Met  11/15/2023   Pt goal: pt to report no hip pain at end of 60 mile bike ride. [x] Progressing  [] Met  [] Not Met          Plan     Continue progressing glute/ core strength as tolerated.    Winifred Hsu, PT, DPT

## 2023-11-29 ENCOUNTER — CLINICAL SUPPORT (OUTPATIENT)
Dept: REHABILITATION | Facility: HOSPITAL | Age: 63
End: 2023-11-29
Payer: COMMERCIAL

## 2023-11-29 DIAGNOSIS — R29.898 WEAKNESS OF LEFT LOWER EXTREMITY: ICD-10-CM

## 2023-11-29 DIAGNOSIS — M25.552 PAIN OF LEFT HIP: Primary | ICD-10-CM

## 2023-11-29 PROCEDURE — 97112 NEUROMUSCULAR REEDUCATION: CPT

## 2023-11-29 PROCEDURE — 97110 THERAPEUTIC EXERCISES: CPT

## 2023-11-29 PROCEDURE — 97530 THERAPEUTIC ACTIVITIES: CPT

## 2023-12-01 NOTE — PROGRESS NOTES
[facility]   Physical Therapy Treatment Note      Name: Kar Lee  Clinic Number: [id number]    Therapy Diagnosis:   Encounter Diagnoses   Name Primary?    Pain of left hip Yes    Weakness of left lower extremity        Physician: Kristin Huston MD    Visit Date: 11/29/2023    Physician Orders: PT Eval and Treat  Medical Diagnosis from Referral: M25.552 (ICD-10-CM) - Left hip pain  Evaluation Date: 10/16/2023  Authorization Period Expiration: 12/31/2023  Plan of Care Expiration: 12/15/2023  Visit # / Visits authorized: 5/20   FOTO: 1/3 (last performed on 10/16/2023)    Time In: 5:30 pm   Time Out: 6:30 pm   Total Billable Time: 55 minutes    Subjective     Pt reports: no pain after last visit. Pt tolerating progressed gym routine well. Hopes to go for bike ride this weekend dependent on weather    He was compliant with home exercise program.    Response to previous treatment:/Functional change: decreased pain while bike riding     Pain: 1/10  Location: left hip      Objective      Hip Active Range of Motion:    Right  Left    Flexion 115 115   Abduction WNL WNL   Extension 10 10   Ext. Rotation 20 20   Int. Rotation 30 30        Treatment     Kar received the treatments listed below:      therapeutic exercises to develop strength, endurance, ROM, flexibility, posture, and core stabilization for 15 minutes including:    Upright bike for tissue extensibility/ pain modulation 10 min, level 10   Pt education on gym routine for HEP    manual therapy techniques: Joint mobilizations were applied to the: L hip for 0 minutes, including:    PROM check  Hip IR stretch    neuromuscular re-education activities to improve: Balance, Coordination, Kinesthetic, Sense, Proprioception, and Posture for 27 minutes. The following activities were included:    SL bridge w/ knee hug 4x10  Modified side plank with hip abduction 3x10 B  SL RDL 3x12 B w/ 25#   Bird dog 3x10    NP today  Wall Clams 3x10 GTB  SL Anti  "rot press 3x10 w/ 10#   Lateral lunge sliders 3x6 B   Side Plank 3x20" bilateral   Lateral Band Walks 3x10 each way BTB    therapeutic activities to improve functional performance for 18 minutes, including:  KB DL 4x12 w/ 45#  Rotational step up 6" 4x10  12" lateral step up 4x8    Not Performed today:   Wall sit 3x30"  6" rotation step up 3x12  Eason's Carry March w/ 25# Kbs 3 laps   Reverse Lunge to knee drive 3x10 B w/ 15# DBs  40# KB unilat farmers walk in R UE 3 laps 40 ft      Patient Education and Home Exercises       Education provided:   - gym routine for HEP    Written Home Exercises Provided: yes. Exercises were reviewed and Kar was able to demonstrate them prior to the end of the session.  Kar demonstrated good  understanding of the education provided. See EMR under Patient Instructions for exercises provided during therapy sessions    Assessment     Pt presents w/ good carryover of glute /core activation. Pt tolerates progressed functional strengthening well today. Pt educated on gym progressions and activation routine prior to cycling. Will re-assess sx next visit and initiate transition to independent HEP if appropriate    Kar Is progressing well towards his goals.   Pt prognosis is Good.     Pt will continue to benefit from skilled outpatient physical therapy to address the deficits listed in the problem list box on initial evaluation, provide pt/family education and to maximize pt's level of independence in the home and community environment.     Pt's spiritual, cultural and educational needs considered and pt agreeable to plan of care and goals.     Anticipated barriers to physical therapy: work schedule    Goals:     Short Term Goals:  4 weeks Status  Date Met   Pt will report worst pain of </= 4/10 in order to progress toward max functional ability and improve quality of life. [] Progressing  [x] Met  [] Not Met  11/15/2023   Patient will demonstrate improved function as indicated by a score " of greater than or equal to 70 out of 100 on FOTO. [x] Progressing  [] Met  [] Not Met     Pt will be compliant with HEP 50% of prescribed amount.  [] Progressing  [x] Met  [] Not Met 11/15/2023    The pt to demo improvement in single leg balance on L side equal to time performed on R side.  [x] Progressing  [] Met  [] Not Met        Long Term Goals:  8 weeks Status Date Met   Pt will report worst pain of </= 2/10 in order to progress toward max functional ability and improve quality of life [x] Progressing  [] Met  [] Not Met     Patient will improve their FOTO limitation score to at least 75 as evidence of clinically significant improvements in their function. [x] Progressing  [] Met  [] Not Met     Pt to demo at least 4+/5 on strength testing of hip musculature to demo improvement in tolerance to activity.  [x] Progressing  [] Met  [] Not Met     Pt to demo functional movement patterns without pain relating to L hip.   [x] Progressing  [] Met  [] Not Met     Pt will be compliant with % of prescribed amount.  [] Progressing  [x] Met  [] Not Met  11/15/2023   Pt goal: pt to report no hip pain at end of 60 mile bike ride. [x] Progressing  [] Met  [] Not Met          Plan     Continue progressing glute/ core strength as tolerated.    Ace Izquierdo, PT, DPT

## 2023-12-07 ENCOUNTER — CLINICAL SUPPORT (OUTPATIENT)
Dept: REHABILITATION | Facility: HOSPITAL | Age: 63
End: 2023-12-07
Payer: COMMERCIAL

## 2023-12-07 DIAGNOSIS — R29.898 WEAKNESS OF LEFT LOWER EXTREMITY: ICD-10-CM

## 2023-12-07 DIAGNOSIS — M25.552 PAIN OF LEFT HIP: Primary | ICD-10-CM

## 2023-12-07 PROCEDURE — 97110 THERAPEUTIC EXERCISES: CPT

## 2023-12-07 PROCEDURE — 97112 NEUROMUSCULAR REEDUCATION: CPT

## 2023-12-07 PROCEDURE — 97530 THERAPEUTIC ACTIVITIES: CPT

## 2023-12-07 NOTE — PROGRESS NOTES
[facility]   Physical Therapy Treatment Note      Name: Kar Lee  Clinic Number: [id number]    Therapy Diagnosis:   Encounter Diagnoses   Name Primary?    Pain of left hip Yes    Weakness of left lower extremity      Physician: Kristin Huston MD    Visit Date: 12/7/2023    Physician Orders: PT Eval and Treat  Medical Diagnosis from Referral: M25.552 (ICD-10-CM) - Left hip pain  Evaluation Date: 10/16/2023  Authorization Period Expiration: 12/31/2023  Plan of Care Expiration: 12/15/2023  Visit # / Visits authorized: 6/20   FOTO: 1/3 (last performed on 10/16/2023)    Time In: 5:00 pm   Time Out: 6:00 pm   Total Billable Time: 60 minutes    Subjective     Pt reports: doing well. No hip pain on recent 40 mile bike ride and has been going to the gym.     He was compliant with home exercise program.    Response to previous treatment:/Functional change: decreased pain while bike riding     Pain: 1/10  Location: left hip      Objective      Hip Active Range of Motion:    Right  Left    Flexion 115 115   Abduction WNL WNL   Extension 10 10   Ext. Rotation 20 20   Int. Rotation 30 30        Treatment     Kar received the treatments listed below:      therapeutic exercises to develop strength, endurance, ROM, flexibility, posture, and core stabilization for 20 minutes including:    Upright bike for tissue extensibility/ pain modulation 10 min, level 10   Pt education on gym routine for HEP  Reassessment     manual therapy techniques: Joint mobilizations were applied to the: L hip for 0 minutes, including:    PROM check  Hip IR stretch    neuromuscular re-education activities to improve: Balance, Coordination, Kinesthetic, Sense, Proprioception, and Posture for 20 minutes. The following activities were included:    SL bridge w/ knee hug 3x10  Modified side plank with hip abduction 3x10 B  Wall Clams 3x15 GTB  Lateral Lunges x10 BW, x10 w/15#     NP today    SL Anti rot press 3x10 w/ 10#   Side Plank  "3x20" bilateral   Lateral Band Walks 3x10 each way BTB  SL RDL 3x12 B w/ 25#   Bird dog 3x10    therapeutic activities to improve functional performance for 20 minutes, including:  Trap bar DL 4x12 w/ 90#, 2 minutes rest between   12" step up with knee drive 25# Kbs 3x8       Not Performed today:   Wall sit 3x30"  6" rotation step up 3x12  Eason's Carry March w/ 25# Kbs 3 laps   Reverse Lunge to knee drive 3x10 B w/ 15# DBs  40# KB unilat farmers walk in R UE 3 laps 40 ft  Rotational step up 6" 4x10  12" lateral step up 4x8      Patient Education and Home Exercises       Education provided:   - gym routine for HEP    Written Home Exercises Provided: yes. Exercises were reviewed and Kar was able to demonstrate them prior to the end of the session.  Kar demonstrated good  understanding of the education provided. See EMR under Patient Instructions for exercises provided during therapy sessions    Assessment     Pt presents reporting no hip pain/symptoms with recent bike rides and activities. Continues to tolerate functional strengthening well. Discussed transition to independent HEP with patient, and he agreed that he is ready based on his progress. Updated HEP was provided and discussed dosage and frequency of gym routine/HEP to supplement cycling. Pt demonstrated good understanding. Pt has met most goals and is adequate for discharge to HEP to continue working on lower extremity strength.     Kar has met most his goals.   Pt prognosis is Good.     Pt will continue to benefit from skilled outpatient physical therapy to address the deficits listed in the problem list box on initial evaluation, provide pt/family education and to maximize pt's level of independence in the home and community environment.     Pt's spiritual, cultural and educational needs considered and pt agreeable to plan of care and goals.     Anticipated barriers to physical therapy: work schedule    Goals:     Short Term Goals:  4 weeks Status  " Date Met   Pt will report worst pain of </= 4/10 in order to progress toward max functional ability and improve quality of life. [] Progressing  [x] Met  [] Not Met  11/15/2023   Patient will demonstrate improved function as indicated by a score of greater than or equal to 70 out of 100 on FOTO. [] Progressing  [x] Met  [] Not Met  12/7/2023   Pt will be compliant with HEP 50% of prescribed amount.  [] Progressing  [x] Met  [] Not Met 11/15/2023    The pt to demo improvement in single leg balance on L side equal to time performed on R side.  [] Progressing  [x] Met  [] Not Met 12/7/2023       Long Term Goals:  8 weeks Status Date Met   Pt will report worst pain of </= 2/10 in order to progress toward max functional ability and improve quality of life [] Progressing  [x] Met  [] Not Met 12/7/2023    Patient will improve their FOTO limitation score to at least 75 as evidence of clinically significant improvements in their function. [x] Progressing  [] Met  [] Not Met     Pt to demo at least 4+/5 on strength testing of hip musculature to demo improvement in tolerance to activity.  [x] Progressing  [] Met  [] Not Met     Pt to demo functional movement patterns without pain relating to L hip.   [] Progressing  [x] Met  [] Not Met 12/7/2023    Pt will be compliant with % of prescribed amount.  [] Progressing  [x] Met  [] Not Met  11/15/2023   Pt goal: pt to report no hip pain at end of 60 mile bike ride. [] Progressing  [x] Met  [] Not Met 12/7/2023         Plan     Patient discharged to [facility] today.     Winifred Hsu, PT, DPT

## 2024-05-17 DIAGNOSIS — I10 ESSENTIAL HYPERTENSION: ICD-10-CM

## 2024-05-17 RX ORDER — AMLODIPINE BESYLATE 10 MG/1
10 TABLET ORAL
Qty: 90 TABLET | Refills: 1 | Status: SHIPPED | OUTPATIENT
Start: 2024-05-17

## 2024-05-17 NOTE — TELEPHONE ENCOUNTER
Care Due:                  Date            Visit Type   Department     Provider  --------------------------------------------------------------------------------                                EP -                              PRIMARY      MET INTERNAL  Last Visit: 08-      CARE ([facility])   MEDICINE       [place]  Next Visit: None Scheduled  None         None Found                                                            Last  Test          Frequency    Reason                     Performed    Due Date  --------------------------------------------------------------------------------    CMP.........  12 months..  atorvastatin.............  07- 07-    Lipid Panel.  12 months..  atorvastatin.............  07- 07-    [employer] Embedded Care Due Messages. Reference number: [id number].   5/17/2024 9:04:43 AM CDT

## 2024-05-18 NOTE — TELEPHONE ENCOUNTER
Provider Staff:  Action required for this patient    Requires labs      Please see care gap opportunities below in Care Due Message.    Thanks!  [facility]     Appointments      Date Provider   Last Visit   8/11/2023 Los Niño MD   Next Visit   Visit date not found Los Niño MD     Refill Decision Note   Kar Lee  is requesting a refill authorization.  Brief Assessment and Rationale for Refill:  Approve     Medication Therapy Plan: CMP, LIPID DUE 7/2024; 8/11/23: Per OV Hypertension remains well controlled on amlodipine 10 mg daily.      Comments:     Note composed:11:46 PM 05/17/2024

## 2024-07-31 DIAGNOSIS — I10 ESSENTIAL HYPERTENSION: ICD-10-CM

## 2024-08-19 DIAGNOSIS — Z12.5 PROSTATE CANCER SCREENING: ICD-10-CM

## 2024-08-19 DIAGNOSIS — E78.00 PURE HYPERCHOLESTEROLEMIA: ICD-10-CM

## 2024-08-19 DIAGNOSIS — I10 ESSENTIAL HYPERTENSION: ICD-10-CM

## 2024-08-19 DIAGNOSIS — Z00.00 WELL ADULT EXAM: Primary | ICD-10-CM

## 2024-08-19 DIAGNOSIS — R73.03 PREDIABETES: ICD-10-CM

## 2024-08-19 RX ORDER — ATORVASTATIN CALCIUM 10 MG/1
10 TABLET, FILM COATED ORAL
Qty: 90 TABLET | Refills: 0 | Status: SHIPPED | OUTPATIENT
Start: 2024-08-19

## 2024-08-19 NOTE — TELEPHONE ENCOUNTER
Care Due:                  Date            Visit Type   Department     Provider  --------------------------------------------------------------------------------                                EP -                              PRIMARY      MET INTERNAL  Last Visit: 08-      CARE ([facility])   MEDICINE       [place]  Next Visit: None Scheduled  None         None Found                                                            Last  Test          Frequency    Reason                     Performed    Due Date  --------------------------------------------------------------------------------    Office Visit  15 months..  amLODIPine, atorvastatin.  08- 11-    CMP.........  12 months..  atorvastatin.............  07- 07-    Lipid Panel.  12 months..  atorvastatin.............  07- 07-    [employer] Embedded Care Due Messages. Reference number: [id number].   8/19/2024 7:30:31 AM CDT

## 2024-08-19 NOTE — TELEPHONE ENCOUNTER
Refill Routing Note   Medication(s) are not appropriate for processing by [facility] for the following reason(s):        Required labs outdated    [facility] action(s):  Defer   Requires appointment : Yes     Requires labs : Yes             Appointments  past 12m or future 3m with PCP    Date Provider   Last Visit   8/11/2023 Los Niño MD   Next Visit   Visit date not found Los Niño MD   ED visits in past 90 days: 0        Note composed:3:28 PM 08/19/2024

## 2024-08-29 ENCOUNTER — LAB VISIT (OUTPATIENT)
Dept: LAB | Facility: HOSPITAL | Age: 64
End: 2024-08-29
Attending: FAMILY MEDICINE
Payer: COMMERCIAL

## 2024-08-29 DIAGNOSIS — R73.03 PREDIABETES: ICD-10-CM

## 2024-08-29 DIAGNOSIS — Z12.5 PROSTATE CANCER SCREENING: ICD-10-CM

## 2024-08-29 DIAGNOSIS — Z00.00 WELL ADULT EXAM: ICD-10-CM

## 2024-08-29 DIAGNOSIS — E78.00 PURE HYPERCHOLESTEROLEMIA: ICD-10-CM

## 2024-08-29 LAB
ALBUMIN SERPL BCP-MCNC: 4 G/DL (ref 3.5–5.2)
ALP SERPL-CCNC: 73 U/L (ref 55–135)
ALT SERPL W/O P-5'-P-CCNC: 20 U/L (ref 10–44)
ANION GAP SERPL CALC-SCNC: 11 MMOL/L (ref 8–16)
AST SERPL-CCNC: 24 U/L (ref 10–40)
BASOPHILS # BLD AUTO: 0.03 K/UL (ref 0–0.2)
BASOPHILS NFR BLD: 0.5 % (ref 0–1.9)
BILIRUB SERPL-MCNC: 1 MG/DL (ref 0.1–1)
BUN SERPL-MCNC: 19 MG/DL (ref 8–23)
CALCIUM SERPL-MCNC: 9.6 MG/DL (ref 8.7–10.5)
CHLORIDE SERPL-SCNC: 105 MMOL/L (ref 95–110)
CHOLEST SERPL-MCNC: 137 MG/DL (ref 120–199)
CHOLEST/HDLC SERPL: 2.7 {RATIO} (ref 2–5)
CO2 SERPL-SCNC: 21 MMOL/L (ref 23–29)
COMPLEXED PSA SERPL-MCNC: 1.4 NG/ML (ref 0–4)
CREAT SERPL-MCNC: 1 MG/DL (ref 0.5–1.4)
DIFFERENTIAL METHOD BLD: ABNORMAL
EOSINOPHIL # BLD AUTO: 0.2 K/UL (ref 0–0.5)
EOSINOPHIL NFR BLD: 3.6 % (ref 0–8)
ERYTHROCYTE [DISTWIDTH] IN BLOOD BY AUTOMATED COUNT: 12.9 % (ref 11.5–14.5)
EST. GFR  (NO RACE VARIABLE): >60 ML/MIN/1.73 M^2
ESTIMATED AVG GLUCOSE: 120 MG/DL (ref 68–131)
GLUCOSE SERPL-MCNC: 99 MG/DL (ref 70–110)
HBA1C MFR BLD: 5.8 % (ref 4–5.6)
HCT VFR BLD AUTO: 43.5 % (ref 40–54)
HDLC SERPL-MCNC: 50 MG/DL (ref 40–75)
HDLC SERPL: 36.5 % (ref 20–50)
HGB BLD-MCNC: 14 G/DL (ref 14–18)
IMM GRANULOCYTES # BLD AUTO: 0.01 K/UL (ref 0–0.04)
IMM GRANULOCYTES NFR BLD AUTO: 0.2 % (ref 0–0.5)
LDLC SERPL CALC-MCNC: 70.6 MG/DL (ref 63–159)
LYMPHOCYTES # BLD AUTO: 1.8 K/UL (ref 1–4.8)
LYMPHOCYTES NFR BLD: 29.1 % (ref 18–48)
MCH RBC QN AUTO: 30.6 PG (ref 27–31)
MCHC RBC AUTO-ENTMCNC: 32.2 G/DL (ref 32–36)
MCV RBC AUTO: 95 FL (ref 82–98)
MONOCYTES # BLD AUTO: 0.5 K/UL (ref 0.3–1)
MONOCYTES NFR BLD: 8.1 % (ref 4–15)
NEUTROPHILS # BLD AUTO: 3.5 K/UL (ref 1.8–7.7)
NEUTROPHILS NFR BLD: 58.5 % (ref 38–73)
NONHDLC SERPL-MCNC: 87 MG/DL
NRBC BLD-RTO: 0 /100 WBC
PLATELET # BLD AUTO: 234 K/UL (ref 150–450)
PMV BLD AUTO: 11 FL (ref 9.2–12.9)
POTASSIUM SERPL-SCNC: 4.6 MMOL/L (ref 3.5–5.1)
PROT SERPL-MCNC: 6.9 G/DL (ref 6–8.4)
RBC # BLD AUTO: 4.58 M/UL (ref 4.6–6.2)
SODIUM SERPL-SCNC: 137 MMOL/L (ref 136–145)
T4 FREE SERPL-MCNC: 0.86 NG/DL (ref 0.71–1.51)
TRIGL SERPL-MCNC: 82 MG/DL (ref 30–150)
TSH SERPL DL<=0.005 MIU/L-ACNC: 1.91 UIU/ML (ref 0.4–4)
WBC # BLD AUTO: 6.05 K/UL (ref 3.9–12.7)

## 2024-08-29 PROCEDURE — 84443 ASSAY THYROID STIM HORMONE: CPT | Performed by: FAMILY MEDICINE

## 2024-08-29 PROCEDURE — 83036 HEMOGLOBIN GLYCOSYLATED A1C: CPT | Performed by: FAMILY MEDICINE

## 2024-08-29 PROCEDURE — 84153 ASSAY OF PSA TOTAL: CPT | Performed by: FAMILY MEDICINE

## 2024-08-29 PROCEDURE — 84439 ASSAY OF FREE THYROXINE: CPT | Performed by: FAMILY MEDICINE

## 2024-08-29 PROCEDURE — 80061 LIPID PANEL: CPT | Performed by: FAMILY MEDICINE

## 2024-08-29 PROCEDURE — 85025 COMPLETE CBC W/AUTO DIFF WBC: CPT | Performed by: FAMILY MEDICINE

## 2024-08-29 PROCEDURE — 36415 COLL VENOUS BLD VENIPUNCTURE: CPT | Mod: PO | Performed by: FAMILY MEDICINE

## 2024-08-29 PROCEDURE — 80053 COMPREHEN METABOLIC PANEL: CPT | Performed by: FAMILY MEDICINE

## 2024-09-05 ENCOUNTER — OFFICE VISIT (OUTPATIENT)
Dept: INTERNAL MEDICINE | Facility: CLINIC | Age: 64
End: 2024-09-05
Payer: COMMERCIAL

## 2024-09-05 VITALS
HEIGHT: 64 IN | WEIGHT: 174.63 LBS | SYSTOLIC BLOOD PRESSURE: 122 MMHG | HEART RATE: 68 BPM | BODY MASS INDEX: 29.81 KG/M2 | OXYGEN SATURATION: 98 % | RESPIRATION RATE: 18 BRPM | DIASTOLIC BLOOD PRESSURE: 74 MMHG

## 2024-09-05 DIAGNOSIS — D64.9 NORMOCYTIC ANEMIA: ICD-10-CM

## 2024-09-05 DIAGNOSIS — R73.03 PREDIABETES: ICD-10-CM

## 2024-09-05 DIAGNOSIS — E78.00 PURE HYPERCHOLESTEROLEMIA: ICD-10-CM

## 2024-09-05 DIAGNOSIS — Z00.00 ANNUAL PHYSICAL EXAM: Primary | ICD-10-CM

## 2024-09-05 DIAGNOSIS — M65.9 TENOSYNOVITIS: ICD-10-CM

## 2024-09-05 DIAGNOSIS — I10 ESSENTIAL HYPERTENSION: ICD-10-CM

## 2024-09-05 DIAGNOSIS — M16.12 PRIMARY OSTEOARTHRITIS OF LEFT HIP: ICD-10-CM

## 2024-09-05 PROCEDURE — 1159F MED LIST DOCD IN RCRD: CPT | Mod: CPTII,S$GLB,, | Performed by: NURSE PRACTITIONER

## 2024-09-05 PROCEDURE — 3008F BODY MASS INDEX DOCD: CPT | Mod: CPTII,S$GLB,, | Performed by: NURSE PRACTITIONER

## 2024-09-05 PROCEDURE — 3074F SYST BP LT 130 MM HG: CPT | Mod: CPTII,S$GLB,, | Performed by: NURSE PRACTITIONER

## 2024-09-05 PROCEDURE — 99999 PR PBB SHADOW E&M-EST. PATIENT-LVL III: CPT | Mod: PBBFAC,,, | Performed by: NURSE PRACTITIONER

## 2024-09-05 PROCEDURE — 3078F DIAST BP <80 MM HG: CPT | Mod: CPTII,S$GLB,, | Performed by: NURSE PRACTITIONER

## 2024-09-05 PROCEDURE — 99396 PREV VISIT EST AGE 40-64: CPT | Mod: S$GLB,,, | Performed by: NURSE PRACTITIONER

## 2024-09-05 PROCEDURE — 3044F HG A1C LEVEL LT 7.0%: CPT | Mod: CPTII,S$GLB,, | Performed by: NURSE PRACTITIONER

## 2024-09-05 RX ORDER — DICLOFENAC SODIUM 10 MG/G
2 GEL TOPICAL 3 TIMES DAILY PRN
Qty: 100 G | Refills: 0 | Status: SHIPPED | OUTPATIENT
Start: 2024-09-05

## 2024-09-05 NOTE — PROGRESS NOTES
INTERNAL MEDICINE PROGRESS/URGENT CARE NOTE    CHIEF COMPLAINT     No chief complaint on file.      HPI     Kar Lee is a 63 y.o. male who presents for an annual visit today.    PCP: Dr. Niño    History of hypertension well-controlled on 10 mg of amlodipine.  He is asymptomatic he does not check his pressures.  History of high cholesterol on 10 mg of Lipitor.  No side effects.  Has arthritis in his hip.  On joint supplement tablets from his orthopedic.  He had labs done prior to visit which we reviewed in detail.  Normocytic anemia near resolved.  Prediabetes.  A1c up a bit at 5.8.  Has not been exercising and dieting like he normally does.  Prostate level was fine.  The stable.  Rides his bike a few times a week. Taking care of elderly parent.   Diet: could be better  Doesn't smoke  Drinks ETOH occasionally.     His health maintenance is up-to-date.  He will be due for a flu shot this month  His only complaint today is some left wrist pain that he gets over the radial area which goes into his thumb.  Find it is worse after he rides his bike.  It is worse with certain movements as well.  He tried ice with minimal relief.  Also bought a wrist brace to see if that would help    Patient Active Problem List   Diagnosis    Left hip pain    Hip osteoarthritis    Status post arthroscopy of hip    Normocytic anemia    Essential hypertension    Prediabetes    Pain of left hip    Weakness of left lower extremity        Past Medical History:  Past Medical History:   Diagnosis Date    Hypertension         Past Surgical History:  Past Surgical History:   Procedure Laterality Date    COLONOSCOPY N/A 11/20/2017    Procedure: COLONOSCOPY;  Surgeon: Abdi Barakat MD;  Location: [address];  Service: Endoscopy;  Laterality: N/A;    HIP SURGERY Left 1/19/2016    Dr Huston     TONSILLECTOMY          Allergies:  Review of patient's allergies indicates:  No Known Allergies    Home Medications:    Current Outpatient  "Medications:     amLODIPine (NORVASC) 10 MG tablet, TAKE ONE TABLET BY MOUTH EVERY DAY, Disp: 90 tablet, Rfl: 1    aspirin (ECOTRIN) 81 MG EC tablet, Take 81 mg by mouth once daily., Disp: , Rfl:     atorvastatin (LIPITOR) 10 MG tablet, TAKE ONE TABLET BY MOUTH ONCE DAILY, Disp: 90 tablet, Rfl: 0    diclofenac sodium (VOLTAREN ARTHRITIS PAIN) 1 % Gel, Apply 2 g topically 3 (three) times daily as needed (wrist pain)., Disp: 100 g, Rfl: 0    multivitamin (THERAGRAN) tablet, Take 1 tablet by mouth once daily., Disp: , Rfl:      Review of Systems:  Review of Systems   Constitutional:  Negative for appetite change, chills, diaphoresis, fatigue and fever.   HENT:  Negative for congestion, ear pain, rhinorrhea and sore throat.    Respiratory:  Negative for cough, shortness of breath and wheezing.    Cardiovascular:  Negative for chest pain, palpitations and leg swelling.   Gastrointestinal:  Negative for abdominal pain, constipation, diarrhea, nausea and vomiting.   Genitourinary:  Negative for dysuria, frequency, hematuria and urgency.   Musculoskeletal:  Positive for arthralgias.   Skin:  Negative for rash.   Neurological:  Negative for dizziness, weakness and headaches.   Hematological:  Negative for adenopathy.   Psychiatric/Behavioral:  Negative for suicidal ideas.          PHYSICAL EXAM     Vitals:    09/05/24 1351   BP: 122/74   BP Location: Left arm   Patient Position: Sitting   BP Method: Large (Manual)   Pulse: 68   Resp: 18   SpO2: 98%   Weight: 79.2 kg (174 lb 9.7 oz)   Height: 5' 4" (1.626 m)      Body mass index is 29.97 kg/m².     Physical Exam  Vitals reviewed.   Constitutional:       General: He is not in acute distress.     Appearance: Normal appearance. He is not ill-appearing or toxic-appearing.   HENT:      Head: Normocephalic and atraumatic.      Right Ear: Tympanic membrane normal.      Left Ear: Tympanic membrane normal.      Mouth/Throat:      Mouth: Mucous membranes are moist.      Pharynx: " Oropharynx is clear. No oropharyngeal exudate or posterior oropharyngeal erythema.   Eyes:      Extraocular Movements: Extraocular movements intact.      Conjunctiva/sclera: Conjunctivae normal.      Pupils: Pupils are equal, round, and reactive to light.   Neck:      Vascular: No carotid bruit.   Cardiovascular:      Rate and Rhythm: Normal rate and regular rhythm.      Heart sounds: Normal heart sounds.   Pulmonary:      Effort: Pulmonary effort is normal.      Breath sounds: Normal breath sounds.   Abdominal:      General: Bowel sounds are normal.      Palpations: Abdomen is soft. There is no mass.      Tenderness: There is no abdominal tenderness.   Musculoskeletal:      Left wrist: No swelling, bony tenderness or snuff box tenderness. Normal pulse.      Left hand: No bony tenderness. Normal capillary refill. Normal pulse.      Cervical back: Normal range of motion.      Right lower leg: No edema.      Left lower leg: No edema.      Comments: Positive Finkelstein on left   Lymphadenopathy:      Cervical: No cervical adenopathy.   Skin:     General: Skin is warm and dry.      Capillary Refill: Capillary refill takes less than 2 seconds.      Findings: No rash.   Neurological:      General: No focal deficit present.      Mental Status: He is alert and oriented to person, place, and time.   Psychiatric:         Mood and Affect: Mood normal.         Behavior: Behavior normal.         LABS     Lab Results   Component Value Date    HGBA1C 5.8 (H) 08/29/2024     CMP  Sodium   Date Value Ref Range Status   08/29/2024 137 136 - 145 mmol/L Final     Potassium   Date Value Ref Range Status   08/29/2024 4.6 3.5 - 5.1 mmol/L Final     Chloride   Date Value Ref Range Status   08/29/2024 105 95 - 110 mmol/L Final     CO2   Date Value Ref Range Status   08/29/2024 21 (L) 23 - 29 mmol/L Final     Glucose   Date Value Ref Range Status   08/29/2024 99 70 - 110 mg/dL Final     BUN   Date Value Ref Range Status   08/29/2024 19 8 -  23 mg/dL Final     Creatinine   Date Value Ref Range Status   08/29/2024 1.0 0.5 - 1.4 mg/dL Final     Calcium   Date Value Ref Range Status   08/29/2024 9.6 8.7 - 10.5 mg/dL Final     Total Protein   Date Value Ref Range Status   08/29/2024 6.9 6.0 - 8.4 g/dL Final     Albumin   Date Value Ref Range Status   08/29/2024 4.0 3.5 - 5.2 g/dL Final     Total Bilirubin   Date Value Ref Range Status   08/29/2024 1.0 0.1 - 1.0 mg/dL Final     Comment:     For infants and newborns, interpretation of results should be based  on gestational age, weight and in agreement with clinical  observations.    Premature Infant recommended reference ranges:  Up to 24 hours.............<8.0 mg/dL  Up to 48 hours............<12.0 mg/dL  3-5 days..................<15.0 mg/dL  6-29 days.................<15.0 mg/dL       Alkaline Phosphatase   Date Value Ref Range Status   08/29/2024 73 55 - 135 U/L Final     AST   Date Value Ref Range Status   08/29/2024 24 10 - 40 U/L Final     ALT   Date Value Ref Range Status   08/29/2024 20 10 - 44 U/L Final     Anion Gap   Date Value Ref Range Status   08/29/2024 11 8 - 16 mmol/L Final     eGFR if    Date Value Ref Range Status   07/11/2022 >60.0 >60 mL/min/1.73 m^2 Final     eGFR if non    Date Value Ref Range Status   07/11/2022 >60.0 >60 mL/min/1.73 m^2 Final     Comment:     Calculation used to obtain the estimated glomerular filtration  rate (eGFR) is the CKD-EPI equation.        Lab Results   Component Value Date    WBC 6.05 08/29/2024    HGB 14.0 08/29/2024    HCT 43.5 08/29/2024    MCV 95 08/29/2024     08/29/2024     Lab Results   Component Value Date    CHOL 137 08/29/2024    CHOL 108 (L) 07/24/2023    CHOL 131 10/27/2022     Lab Results   Component Value Date    HDL 50 08/29/2024    HDL 38 (L) 07/24/2023    HDL 46 10/27/2022     Lab Results   Component Value Date    LDLCALC 70.6 08/29/2024    LDLCALC 51.6 (L) 07/24/2023    LDLCALC 66.6 10/27/2022      Lab Results   Component Value Date    TRIG 82 08/29/2024    TRIG 92 07/24/2023    TRIG 92 10/27/2022     Lab Results   Component Value Date    CHOLHDL 36.5 08/29/2024    CHOLHDL 35.2 07/24/2023    CHOLHDL 35.1 10/27/2022     Lab Results   Component Value Date    TSH 1.906 08/29/2024       ASSESSMENT     1. Annual physical exam    2. Essential hypertension    3. Prediabetes    4. Pure hypercholesterolemia    5. Normocytic anemia    6. Primary osteoarthritis of left hip    7. Tenosynovitis           PLAN  Discussed age and gender appropriate screenings at this visit and encouraged a healthy diet low in simple carbohydrates, and increased physical activity.  Counseled on medically appropriate vaccines based on age and current health condition.  Screening test reviewed and discussed with patient.    Blood pressure stable continue current meds   Prediabetes stable.  Continue to work on low carb diet and exercise and weight loss   Cholesterol looks good.  Continue Lipitor   Normocytic anemia stable.  Colonoscopy is up-to-date  Sees ortho for hip.  Continue joint supplement   Wrist pain congruent with the deQuervain tenosynovitis.  We will start him on Voltaren gel and continue wrist splint.    F/u with pcp in 1 yr or sooner prn    1. Annual physical exam    2. Essential hypertension    3. Prediabetes    4. Pure hypercholesterolemia    5. Normocytic anemia    6. Primary osteoarthritis of left hip    7. Tenosynovitis  - diclofenac sodium (VOLTAREN ARTHRITIS PAIN) 1 % Gel; Apply 2 g topically 3 (three) times daily as needed (wrist pain).  Dispense: 100 g; Refill: 0       Follow up with PCP     Patient's plan/treatment was discussed including medications and possible side effects. Verbalized understanding of all instructions.     This note was partly generated with [employer] voice recognition software. I apologize for any possible typographical errors.          LAYA Campos  Department of Internal Medicine - [facility]  Jermaine Lux  09/05/2024

## 2024-10-25 ENCOUNTER — PATIENT MESSAGE (OUTPATIENT)
Dept: ORTHOPEDICS | Facility: CLINIC | Age: 64
End: 2024-10-25
Payer: COMMERCIAL

## 2024-10-28 ENCOUNTER — HOSPITAL ENCOUNTER (OUTPATIENT)
Dept: RADIOLOGY | Facility: HOSPITAL | Age: 64
Discharge: HOME | End: 2024-10-28
Attending: ORTHOPAEDIC SURGERY
Payer: COMMERCIAL

## 2024-10-28 ENCOUNTER — OFFICE VISIT (OUTPATIENT)
Dept: ORTHOPEDICS | Facility: CLINIC | Age: 64
End: 2024-10-28
Payer: COMMERCIAL

## 2024-10-28 DIAGNOSIS — M15.2 OSTEOARTHRITIS OF PROXIMAL INTERPHALANGEAL (PIP) JOINT OF RIGHT RING FINGER: ICD-10-CM

## 2024-10-28 DIAGNOSIS — M79.642 LEFT HAND PAIN: ICD-10-CM

## 2024-10-28 DIAGNOSIS — M25.532 LEFT WRIST PAIN: ICD-10-CM

## 2024-10-28 DIAGNOSIS — M20.011 ACQUIRED MALLET DEFORMITY OF RIGHT RING FINGER: ICD-10-CM

## 2024-10-28 DIAGNOSIS — M65.4 TENOSYNOVITIS, DE QUERVAIN: Primary | ICD-10-CM

## 2024-10-28 PROCEDURE — 20550 NJX 1 TENDON SHEATH/LIGAMENT: CPT | Mod: LT,S$GLB,, | Performed by: ORTHOPAEDIC SURGERY

## 2024-10-28 PROCEDURE — 99204 OFFICE O/P NEW MOD 45 MIN: CPT | Mod: 25,S$GLB,, | Performed by: ORTHOPAEDIC SURGERY

## 2024-10-28 PROCEDURE — 1159F MED LIST DOCD IN RCRD: CPT | Mod: CPTII,S$GLB,, | Performed by: ORTHOPAEDIC SURGERY

## 2024-10-28 PROCEDURE — 73130 X-RAY EXAM OF HAND: CPT | Mod: TC,LT

## 2024-10-28 PROCEDURE — 3044F HG A1C LEVEL LT 7.0%: CPT | Mod: CPTII,S$GLB,, | Performed by: ORTHOPAEDIC SURGERY

## 2024-10-28 PROCEDURE — 1160F RVW MEDS BY RX/DR IN RCRD: CPT | Mod: CPTII,S$GLB,, | Performed by: ORTHOPAEDIC SURGERY

## 2024-10-28 PROCEDURE — 73130 X-RAY EXAM OF HAND: CPT | Mod: 26,LT,, | Performed by: RADIOLOGY

## 2024-10-28 PROCEDURE — 99999 PR PBB SHADOW E&M-EST. PATIENT-LVL III: CPT | Mod: PBBFAC,,, | Performed by: ORTHOPAEDIC SURGERY

## 2024-10-28 RX ADMIN — METHYLPREDNISOLONE ACETATE 40 MG: 40 INJECTION, SUSPENSION INTRA-ARTICULAR; INTRALESIONAL; INTRAMUSCULAR; SOFT TISSUE at 01:10

## 2024-11-25 ENCOUNTER — OFFICE VISIT (OUTPATIENT)
Dept: ORTHOPEDICS | Facility: CLINIC | Age: 64
End: 2024-11-25
Payer: COMMERCIAL

## 2024-11-25 DIAGNOSIS — M65.4 TENOSYNOVITIS, DE QUERVAIN: Primary | ICD-10-CM

## 2024-11-25 RX ORDER — METHYLPREDNISOLONE ACETATE 40 MG/ML
40 INJECTION, SUSPENSION INTRA-ARTICULAR; INTRALESIONAL; INTRAMUSCULAR; SOFT TISSUE
Status: DISCONTINUED | OUTPATIENT
Start: 2024-10-28 | End: 2024-11-25 | Stop reason: HOSPADM

## 2024-11-25 NOTE — PROGRESS NOTES
[facility]  History & Physical  Orthopedics    SUBJECTIVE:      Chief Complaint:   Chief Complaint   Patient presents with    Left Hand - Pain       Referring Provider: No ref. provider found   Patient is a 64 y.o. right hand dominant male who presents today with complaints of left wrist pain.Patient states that he rides his road bike 100 miles per week.  Recently he has been having more pain in the wrist.  He states he was on a rough road and could not stop due to the pain in the wrist.  He denies numbness and tingling.      He also has a history of a right ring finger flag football injury to his DIP joint, mallet finger.  He has some swelling to the right ring finger PIP joint but does not remember a specific injury.   History of Present Illness      Vitals:    10/28/24 1415   PainSc:   7   PainLoc: Hand     The patient is a/an account.    The patient denies any fevers, chills, N/V, D/C and presents for evaluation.    Past Medical History:   Diagnosis Date    Hypertension      Past Surgical History:   Procedure Laterality Date    COLONOSCOPY N/A 11/20/2017    Procedure: COLONOSCOPY;  Surgeon: Abdi Barakat MD;  Location: [address];  Service: Endoscopy;  Laterality: N/A;    HIP SURGERY Left 1/19/2016    Dr Huston     TONSILLECTOMY       Review of patient's allergies indicates:  No Known Allergies  Social History     Social History Narrative    Not on file     Family History   Problem Relation Name Age of Onset    Retinal detachment Mother      Cataracts Mother      Diabetes Mother      Hypertension Mother      Prostate cancer Father      Hypertension Father      No Known Problems Sister      No Known Problems Sister      No Known Problems Sister      No Known Problems Daughter      No Known Problems Son      No Known Problems Daughter      Glaucoma Neg Hx      Macular degeneration Neg Hx      Strabismus Neg Hx      Amblyopia Neg Hx      Blindness Neg Hx           Current  Outpatient Medications:     amLODIPine (NORVASC) 10 MG tablet, TAKE ONE TABLET BY MOUTH EVERY DAY, Disp: 90 tablet, Rfl: 1    aspirin (ECOTRIN) 81 MG EC tablet, Take 81 mg by mouth once daily., Disp: , Rfl:     atorvastatin (LIPITOR) 10 MG tablet, TAKE ONE TABLET BY MOUTH ONCE DAILY, Disp: 90 tablet, Rfl: 0    diclofenac sodium (VOLTAREN ARTHRITIS PAIN) 1 % Gel, Apply 2 g topically 3 (three) times daily as needed (wrist pain)., Disp: 100 g, Rfl: 0    multivitamin (THERAGRAN) tablet, Take 1 tablet by mouth once daily., Disp: , Rfl:     ROS    Review of Systems:  Constitutional: no fever or chills  Eyes: no visual changes  ENT: no nasal congestion or sore throat  Respiratory: no cough or shortness of breath  Cardiovascular: no chest pain  Gastrointestinal: no nausea or vomiting, tolerating diet  Musculoskeletal: pain and soreness    OBJECTIVE:      Vital Signs (Most Recent):  There were no vitals filed for this visit.  There is no height or weight on file to calculate BMI.    Physical Exam    Physical Exam:  Constitutional: The patient appears well-developed and well-nourished. No distress.   Head: Normocephalic and atraumatic.   Nose: Nose normal.   Eyes: Conjunctivae and EOM are normal.   Neck: No tracheal deviation present.   Cardiovascular: Normal rate and intact distal pulses.    Pulmonary/Chest: Effort normal. No respiratory distress.   Abdominal: There is no guarding.   Lymphatic: Negative for adenopathy   Neurological: The patient is alert.   Psychiatric: The patient has a normal mood and affect.     Bilateral Hand/Wrist Examination:    Observation/Inspection:  Swelling   Left wrist    Deformity  none  Discoloration  none     Scars   none    Atrophy  none    HAND/WRIST EXAMINATION:  Finkelstein's Test   Left wrist  WHAT Test    left  Snuff box tenderness   Neg  Talbert's Test    Neg  Hook of Hamate Tenderness  Neg  CMC grind    Neg  Circumduction test   Neg  TFCC Compression Test  Neg     Tender to palpation  left 1st dorsal extensor compartment, right ring mallet finger, right ring PIP swelling and decreased range of motion and palpable volar spur otherwise bilateral ROM hand/wrist/elbow full  Physical Exam    Neurovascular Exam:  Digits WWP, brisk CR < 3s throughout  NVI motor/LTS to M/R/U nerves, radial pulse 2+  2+ biceps and brachioradialis reflexes    Diagnostic studies and other clinical records review:  X-rays AP, lateral and oblique left hand taken today are independently reviewed by me and shows Eaton II basilar thumb arthritis.    ASSESSMENT/PLAN:    64 y.o. yo male with   Encounter Diagnoses   Name Primary?    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 We discussed the working diagnosis as well as several other potential alternative diagnoses. Treatment options were discussed, both conservative and surgical. Conservative treatment options would include things such as activity modifications, workplace modifications, a period of rest, oral vs topical OTC and prescription anti-inflammatory medications, occupational therapy, splinting/bracing, immobilization, corticosteroid injections, and others. Surgical options were discussed as well.     -I have offered him a selective injection. I have explained the risks, benefits, and alternatives of the procedure in detail.  The patient voices understanding and all questions have been answered. The patient agrees to proceed as planned. So after a sterile prep of the skin in the normal fashion the left 1st dorsal extensor compartment injected using a 25 gauge needle with a combination of 1cc 1% plain lidocaine and 40 mg of methylprednisolone.  The patient is cautioned and immediate relief of pain is secondary to the local anesthetic and will be temporary.  After the  anesthetic wears off there may be a increase in pain that may last for a few hours or a few days and they should use ice to help alleviate this flair up of pain. Patient tolerated the procedure well.    Follow up in 4 weeks virtual audio or sooner for any worsening of symptoms  Call with any questions/concerns in the interim     Assessment & Plan    The patient's pathophysiology was explained in detail with reference to x-rays, models, other visual aids as appropriate.  Questions were invited and answered to the patient's satisfaction. I reviewed Primary care , and other specialty's notes to better coordinate patient's care.          Maya Tsai MD    Please be aware that this note has been generated with the assistance of [employer] voice-to-text.  Please excuse any spelling or grammatical errors.  This note was generated with the assistance of ambient listening technology. Verbal consent was obtained by the patient and accompanying visitor(s) for the recording of patient appointment to facilitate this note. I attest to having reviewed and edited the generated note for accuracy, though some syntax or spelling errors may persist. Please contact the author of this note for any clarification.

## 2024-11-25 NOTE — PROCEDURES
Tendon Sheath    Date/Time: 10/28/2024 1:45 PM    Performed by: Maya Tsai MD  Authorized by: Maya Tsai MD    Consent Done?:  Yes (Verbal)  Indications:  Pain  Prep: patient was prepped and draped in usual sterile fashion      Local anesthesia used?: Yes    Anesthesia:  Local infiltration  Local anesthetic:  Lidocaine 1% without epinephrine  Anesthetic total (ml):  1    Location:  Wrist  Site:  L first doral compartment  Needle size:  25 G  Approach:  Radial  Medications:  40 mg methylPREDNISolone acetate 40 mg/mL  Patient tolerance:  Patient tolerated the procedure well with no immediate complications

## 2024-11-25 NOTE — PROGRESS NOTES
Established Patient - Audio Only Telehealth Visit     The patient location is: [place]  The chief complaint leading to consultation is: left wrist pain and tenosynovitis  Visit type: Virtual visit with audio only (telephone)  Total time spent with patient: 5 mins       The reason for the audio only service rather than synchronous audio and video virtual visit was related to technical difficulties or patient preference/necessity.     Each patient to whom I provide medical services by telemedicine is:  (1) informed of the relationship between the physician and patient and the respective role of any other health care provider with respect to management of the patient; and (2) notified that they may decline to receive medical services by telemedicine and may withdraw from such care at any time. Patient verbally consented to receive this service via voice-only telephone call.       HPI:  Patient states that the left wrist feels much better after steroid injection.  He has seen been riding his bike for the past 2 weeks and has had minimal discomfort.  States that occasionally when he stretches his thumb to  a large object he feels mild discomfort.  He has been working on some range of motion.     Assessment and plan:    Follow up as needed                        This service was not originating from a related E/M service provided within the previous 7 days nor will  to an E/M service or procedure within the next 24 hours or my soonest available appointment.  Prevailing standard of care was able to be met in this audio-only visit.

## 2024-12-16 DIAGNOSIS — E78.00 PURE HYPERCHOLESTEROLEMIA: ICD-10-CM

## 2024-12-16 NOTE — TELEPHONE ENCOUNTER
Care Due:                  Date            Visit Type   Department     Provider  --------------------------------------------------------------------------------                                EP -                              PRIMARY      MET INTERNAL  Last Visit: 08-      CARE ([facility])   MEDICINE       [place]ño  Next Visit: None Scheduled  None         None Found                                                            Last  Test          Frequency    Reason                     Performed    Due Date  --------------------------------------------------------------------------------    Office Visit  15 months..  amLODIPine, atorvastatin.  08- 11-    [facility] Embedded Care Due Messages. Reference number: [id number].   12/16/2024 4:46:08 PM CST

## 2024-12-17 RX ORDER — ATORVASTATIN CALCIUM 10 MG/1
10 TABLET, FILM COATED ORAL
Qty: 90 TABLET | Refills: 1 | Status: SHIPPED | OUTPATIENT
Start: 2024-12-17

## 2025-02-28 DIAGNOSIS — I10 ESSENTIAL HYPERTENSION: ICD-10-CM

## 2025-02-28 RX ORDER — AMLODIPINE BESYLATE 10 MG/1
10 TABLET ORAL
Qty: 90 TABLET | Refills: 3 | Status: SHIPPED | OUTPATIENT
Start: 2025-02-28

## 2025-02-28 NOTE — TELEPHONE ENCOUNTER
Refill Routing Note   Medication(s) are not appropriate for processing by [facility] for the following reason(s):        Patient not seen by provider within 15 months    [facility] action(s):  Defer   Requires appointment : Yes               Appointments  past 12m or future 3m with PCP    Date Provider   Last Visit   8/11/2023 Los Niño MD   Next Visit   Visit date not found Los Niño MD   ED visits in past 90 days: 0        Note composed:11:15 AM 02/28/2025

## 2025-02-28 NOTE — TELEPHONE ENCOUNTER
Care Due:                  Date            Visit Type   Department     Provider  --------------------------------------------------------------------------------                                EP -                              PRIMARY      MET INTERNAL  Last Visit: 08-      CARE ([facility])   MEDICINE       Los Niño  Next Visit: None Scheduled  None         None Found                                                            Last  Test          Frequency    Reason                     Performed    Due Date  --------------------------------------------------------------------------------    Office Visit  15 months..  amLODIPine...............  08- 11-    [facility] Embedded Care Due Messages. Reference number: [id number].   2/28/2025 8:01:31 AM CST

## 2025-07-30 DIAGNOSIS — R73.03 PREDIABETES: ICD-10-CM

## 2025-07-30 DIAGNOSIS — E78.00 PURE HYPERCHOLESTEROLEMIA: ICD-10-CM

## 2025-07-30 DIAGNOSIS — Z12.5 PROSTATE CANCER SCREENING: ICD-10-CM

## 2025-07-30 DIAGNOSIS — Z00.00 ANNUAL PHYSICAL EXAM: Primary | ICD-10-CM

## 2025-07-30 RX ORDER — ATORVASTATIN CALCIUM 10 MG/1
10 TABLET, FILM COATED ORAL
Qty: 90 TABLET | Refills: 0 | Status: SHIPPED | OUTPATIENT
Start: 2025-07-30

## 2025-07-30 NOTE — TELEPHONE ENCOUNTER
Care Due:                  Date            Visit Type   Department     Provider  --------------------------------------------------------------------------------                                EP -                              PRIMARY      MET INTERNAL  Last Visit: 08-      CARE ([facility])   MEDICINE       Los Niño  Next Visit: None Scheduled  None         None Found                                                            Last  Test          Frequency    Reason                     Performed    Due Date  --------------------------------------------------------------------------------    Office Visit  15 months..  amLODIPine, atorvastatin.  08- 11-    CMP.........  12 months..  atorvastatin.............  08- 08-    Lipid Panel.  12 months..  atorvastatin.............  08- 08-    [facility] Embedded Care Due Messages. Reference number: [id number].   7/30/2025 8:00:40 AM CDT

## 2025-07-30 NOTE — TELEPHONE ENCOUNTER
Refill Routing Note   Medication(s) are not appropriate for processing by [facility] for the following reason(s):        Patient not seen by provider within 15 months    [facility] action(s):  Defer   Requires appointment : Yes     Requires labs : Yes             Appointments  past 12m or future 3m with PCP    Date Provider   Last Visit   8/11/2023 Los Niño MD   Next Visit   Visit date not found Los Niño MD   ED visits in past 90 days: 0        Note composed:2:44 PM 07/30/2025

## 2025-08-26 ENCOUNTER — LAB VISIT (OUTPATIENT)
Dept: LAB | Facility: HOSPITAL | Age: 65
End: 2025-08-26
Attending: FAMILY MEDICINE
Payer: COMMERCIAL